# Patient Record
Sex: FEMALE | Race: WHITE | NOT HISPANIC OR LATINO | Employment: UNEMPLOYED | URBAN - METROPOLITAN AREA
[De-identification: names, ages, dates, MRNs, and addresses within clinical notes are randomized per-mention and may not be internally consistent; named-entity substitution may affect disease eponyms.]

---

## 2017-12-09 ENCOUNTER — OFFICE VISIT (OUTPATIENT)
Dept: URGENT CARE | Facility: PHYSICIAN GROUP | Age: 42
End: 2017-12-09
Payer: COMMERCIAL

## 2017-12-09 VITALS
HEART RATE: 90 BPM | TEMPERATURE: 98.1 F | OXYGEN SATURATION: 95 % | RESPIRATION RATE: 16 BRPM | BODY MASS INDEX: 32.47 KG/M2 | HEIGHT: 61 IN | DIASTOLIC BLOOD PRESSURE: 64 MMHG | SYSTOLIC BLOOD PRESSURE: 106 MMHG | WEIGHT: 172 LBS

## 2017-12-09 DIAGNOSIS — H66.002 ACUTE SUPPURATIVE OTITIS MEDIA OF LEFT EAR WITHOUT SPONTANEOUS RUPTURE OF TYMPANIC MEMBRANE, RECURRENCE NOT SPECIFIED: Primary | ICD-10-CM

## 2017-12-09 DIAGNOSIS — R09.81 SINUS CONGESTION: ICD-10-CM

## 2017-12-09 PROCEDURE — 99214 OFFICE O/P EST MOD 30 MIN: CPT | Performed by: PHYSICIAN ASSISTANT

## 2017-12-09 RX ORDER — AMOXICILLIN 500 MG/1
500 CAPSULE ORAL 3 TIMES DAILY
Qty: 30 CAP | Refills: 0 | Status: SHIPPED | OUTPATIENT
Start: 2017-12-09 | End: 2020-11-19

## 2017-12-09 NOTE — PROGRESS NOTES
Subjective:      Maria C Lay is a 42 y.o. female who presents with Sinus Problem (C/o ears feel plugged/wet, runny eyes, nasal congestion, pain x3 days)    PMH:  has a past medical history of Arthritis; Pain; and Tibia and fibula open fracture, right.  MEDS:   Current Outpatient Prescriptions:   •  IBUPROFEN PO, Take  by mouth., Disp: , Rfl:   •  amoxicillin (AMOXIL) 500 MG Cap, Take 1 Cap by mouth 3 times a day., Disp: 30 Cap, Rfl: 0  •  gabapentin (NEURONTIN) 300 MG Cap, Take 300 mg by mouth every bedtime., Disp: , Rfl:   •  hydrocodone-acetaminophen (NORCO) 5-325 MG Tab per tablet, Take 1-2 Tabs by mouth every four hours as needed., Disp: 30 Tab, Rfl: 0  ALLERGIES: No Known Allergies  SURGHX:   Past Surgical History:   Procedure Laterality Date   • STEPHANIE BY LAPAROSCOPY  11/9/2016    Procedure: STEPHANIE BY LAPAROSCOPY;  Surgeon: Campos Frankel M.D.;  Location: SURGERY [facility];  Service:    • ANKLE FUSION Right 5/16/2016    Procedure: ANKLE FUSION deep cultures, bone graft gastroc soleus recession ;  Surgeon: Steffen Apple M.D.;  Location: [facility];  Service:    • HARDWARE REMOVAL ORTHO  5/16/2016    Procedure: HARDWARE REMOVAL ORTHO, repairs as indicated  ;  Surgeon: Steffen Apple M.D.;  Location: [facility];  Service:    • APPENDECTOMY LAPAROSCOPIC  7/14/2012    Performed by CAMPOS FRANKEL at [facility]   • OTHER ORTHOPEDIC SURGERY Right 2007     rt ankle repair,k pins   • OTHER ORTHOPEDIC SURGERY Right 2006 x 2 surgeries     rt ankle tib/fib,external fixator     SOCHX:  reports that she quit smoking about 6 years ago. Her smoking use included Cigarettes. She has a 25.00 pack-year smoking history. She has never used smokeless tobacco. She reports that she does not drink alcohol or use drugs.  FH: family history includes Cancer in her maternal grandmother. Reviewed with patient/family. Not pertinent to this complaint.          Otalgia    There is pain in  "both ears. This is a new problem. The current episode started in the past 7 days. The problem occurs every few minutes. The problem has been rapidly worsening. There has been no fever. The pain is moderate. Associated symptoms include headaches and rhinorrhea. Pertinent negatives include no ear discharge or sore throat. She has tried acetaminophen for the symptoms. The treatment provided no relief. There is no history of hearing loss.       Review of Systems   Constitutional: Negative for fever.   HENT: Positive for congestion, ear pain, rhinorrhea and sinus pain. Negative for ear discharge and sore throat.    Neurological: Positive for headaches.   All other systems reviewed and are negative.         Objective:     /64   Pulse 90   Temp 36.7 °C (98.1 °F)   Resp 16   Ht 1.549 m (5' 1\")   Wt 78 kg (172 lb)   SpO2 95%   BMI 32.50 kg/m²      Physical Exam   Constitutional: She appears well-developed and well-nourished. No distress.   HENT:   Head: Normocephalic.   Right Ear: Tympanic membrane normal.   Left Ear: There is tenderness. Tympanic membrane is erythematous and bulging. A middle ear effusion is present.   Nose: Mucosal edema and rhinorrhea present.   Mouth/Throat: Uvula is midline and oropharynx is clear and moist.   Eyes: Conjunctivae and EOM are normal. Pupils are equal, round, and reactive to light.   Neck: Normal range of motion. Neck supple.   Cardiovascular: Normal rate, regular rhythm and normal heart sounds.    Pulmonary/Chest: Effort normal. No respiratory distress. She has rhonchi. She has no rales.   Abdominal: Soft.   Musculoskeletal: Normal range of motion.   Lymphadenopathy:     She has no cervical adenopathy.   Neurological: She is alert.   Skin: Skin is warm and dry. Capillary refill takes less than 2 seconds.   Psychiatric: She has a normal mood and affect.   Nursing note and vitals reviewed.         Assessment/Plan:     1. Acute suppurative otitis media of left ear without " spontaneous rupture of tympanic membrane, recurrence not specified  amoxicillin (AMOXIL) 500 MG Cap   2. Sinus congestion  amoxicillin (AMOXIL) 500 MG Cap     Motrin/Advil/Ibuprophen 600 mg every 6 hours as needed for pain or fever.    PT should follow up with PCP in 1-2 days for re-evaluation if symptoms have not improved.  Discussed red flags and reasons to return to UC or ED.  Pt and/or family verbalized understanding of diagnosis and follow up instructions and declined informational handout on diagnosis.  PT discharged.

## 2017-12-13 ASSESSMENT — ENCOUNTER SYMPTOMS
HEADACHES: 1
SORE THROAT: 0
SINUS PAIN: 1
RHINORRHEA: 1
FEVER: 0

## 2018-05-12 ENCOUNTER — OFFICE VISIT (OUTPATIENT)
Dept: URGENT CARE | Facility: PHYSICIAN GROUP | Age: 43
End: 2018-05-12
Payer: COMMERCIAL

## 2018-05-12 VITALS
SYSTOLIC BLOOD PRESSURE: 104 MMHG | BODY MASS INDEX: 27.38 KG/M2 | OXYGEN SATURATION: 100 % | HEART RATE: 80 BPM | TEMPERATURE: 98.5 F | DIASTOLIC BLOOD PRESSURE: 62 MMHG | WEIGHT: 145 LBS | HEIGHT: 61 IN

## 2018-05-12 DIAGNOSIS — J01.40 ACUTE PANSINUSITIS, RECURRENCE NOT SPECIFIED: ICD-10-CM

## 2018-05-12 PROCEDURE — 99214 OFFICE O/P EST MOD 30 MIN: CPT | Performed by: NURSE PRACTITIONER

## 2018-05-12 RX ORDER — FLUTICASONE PROPIONATE 50 MCG
2 SPRAY, SUSPENSION (ML) NASAL DAILY
Qty: 1 BOTTLE | Refills: 0 | Status: SHIPPED | OUTPATIENT
Start: 2018-05-12 | End: 2020-11-19

## 2018-05-12 RX ORDER — METHYLPREDNISOLONE 4 MG/1
TABLET ORAL
Qty: 1 KIT | Refills: 0 | Status: SHIPPED | OUTPATIENT
Start: 2018-05-12 | End: 2020-11-19

## 2018-05-12 RX ORDER — DOXYCYCLINE HYCLATE 100 MG
100 TABLET ORAL 2 TIMES DAILY
Qty: 14 TAB | Refills: 0 | Status: SHIPPED | OUTPATIENT
Start: 2018-05-12 | End: 2020-11-19

## 2018-05-12 ASSESSMENT — ENCOUNTER SYMPTOMS
SORE THROAT: 0
SENSORY CHANGE: 0
EYE REDNESS: 0
HEADACHES: 1
SINUS PAIN: 1
WHEEZING: 0
FOCAL WEAKNESS: 0
COUGH: 0
MYALGIAS: 0
DIARRHEA: 0
VOMITING: 0
NAUSEA: 0
NECK PAIN: 0
CONSTIPATION: 0
ABDOMINAL PAIN: 0
CHILLS: 0
DIZZINESS: 0
SHORTNESS OF BREATH: 0
SPEECH CHANGE: 0
WEAKNESS: 0
TINGLING: 0
FEVER: 0
EYE DISCHARGE: 0

## 2018-05-12 NOTE — PROGRESS NOTES
Subjective:      Maria C Lay is a 42 y.o. female who presents with Sinusitis (Rt sided facial pressure, nasal congestion x 1 week )            HPI  Maria C is here for right sided nasal congestion, right sided facial/ear pressure, fatigue x 1 week. Sudafed x 1 which did not help. No nasal spray or flushing.    PMH:  has a past medical history of Arthritis; Pain; and Tibia and fibula open fracture, right.  MEDS:   Current Outpatient Prescriptions:   •  fluticasone (FLONASE) 50 MCG/ACT nasal spray, Spray 2 Sprays in nose every day., Disp: 1 Bottle, Rfl: 0  •  MethylPREDNISolone (MEDROL DOSEPAK) 4 MG Tablet Therapy Pack, Use as directed, Disp: 1 Kit, Rfl: 0  •  doxycycline (VIBRAMYCIN) 100 MG Tab, Take 1 Tab by mouth 2 times a day., Disp: 14 Tab, Rfl: 0  •  IBUPROFEN PO, Take  by mouth., Disp: , Rfl:   •  amoxicillin (AMOXIL) 500 MG Cap, Take 1 Cap by mouth 3 times a day., Disp: 30 Cap, Rfl: 0  •  gabapentin (NEURONTIN) 300 MG Cap, Take 300 mg by mouth every bedtime., Disp: , Rfl:   •  hydrocodone-acetaminophen (NORCO) 5-325 MG Tab per tablet, Take 1-2 Tabs by mouth every four hours as needed., Disp: 30 Tab, Rfl: 0  ALLERGIES: No Known Allergies  SURGHX:   Past Surgical History:   Procedure Laterality Date   • STEPHANIE BY LAPAROSCOPY  11/9/2016    Procedure: STEPHANIE BY LAPAROSCOPY;  Surgeon: Campos Frankel M.D.;  Location: [facility];  Service:    • ANKLE FUSION Right 5/16/2016    Procedure: ANKLE FUSION deep cultures, bone graft gastroc soleus recession ;  Surgeon: Steffen Apple M.D.;  Location: [facility];  Service:    • HARDWARE REMOVAL ORTHO  5/16/2016    Procedure: HARDWARE REMOVAL ORTHO, repairs as indicated  ;  Surgeon: Steffen Apple M.D.;  Location: [facility];  Service:    • APPENDECTOMY LAPAROSCOPIC  7/14/2012    Performed by CAMPOS FRANKEL at [facility]   • OTHER ORTHOPEDIC SURGERY Right 2007     rt ankle repair,k pins   • OTHER ORTHOPEDIC SURGERY Right  "2006 x 2 surgeries     rt ankle tib/fib,external fixator     SOCHX:  reports that she quit smoking about 7 years ago. Her smoking use included Cigarettes. She has a 25.00 pack-year smoking history. She has never used smokeless tobacco. She reports that she does not drink alcohol or use drugs.  FH: Family history was reviewed, no pertinent findings to report  '  Review of Systems   Constitutional: Positive for malaise/fatigue. Negative for chills and fever.   HENT: Positive for congestion, ear pain and sinus pain. Negative for sore throat.    Eyes: Negative for discharge and redness.   Respiratory: Negative for cough, shortness of breath and wheezing.    Gastrointestinal: Negative for abdominal pain, constipation, diarrhea, nausea and vomiting.   Musculoskeletal: Negative for myalgias and neck pain.   Neurological: Positive for headaches. Negative for dizziness, tingling, sensory change, speech change, focal weakness and weakness.   Endo/Heme/Allergies: Negative for environmental allergies.   All other systems reviewed and are negative.         Objective:     /62   Pulse 80   Temp 36.9 °C (98.5 °F)   Ht 1.549 m (5' 1\")   Wt 65.8 kg (145 lb)   SpO2 100%   Breastfeeding? No   BMI 27.40 kg/m²      Physical Exam   Constitutional: She is oriented to person, place, and time. Vital signs are normal. She appears well-developed and well-nourished. She is active and cooperative.  Non-toxic appearance. She does not have a sickly appearance. She appears ill. No distress.   HENT:   Head: Normocephalic.   Right Ear: Hearing and external ear normal. Tympanic membrane is injected and bulging.   Left Ear: Hearing, external ear and ear canal normal. Tympanic membrane is injected.   Nose: Mucosal edema, rhinorrhea and sinus tenderness present. Right sinus exhibits maxillary sinus tenderness and frontal sinus tenderness.   Mouth/Throat: Uvula is midline. Mucous membranes are dry. No uvula swelling. No posterior " oropharyngeal edema or posterior oropharyngeal erythema.   Eyes: Conjunctivae and EOM are normal. Pupils are equal, round, and reactive to light.   Neck: Normal range of motion. Neck supple.   Cardiovascular: Normal rate, regular rhythm and normal heart sounds.    Pulmonary/Chest: Effort normal and breath sounds normal. No accessory muscle usage. No respiratory distress. She has no decreased breath sounds. She has no wheezes. She has no rhonchi. She has no rales.   Musculoskeletal: Normal range of motion.   Neurological: She is alert and oriented to person, place, and time.   Skin: Skin is warm. She is not diaphoretic.   Vitals reviewed.              Assessment/Plan:     1. Acute pansinusitis, recurrence not specified    - fluticasone (FLONASE) 50 MCG/ACT nasal spray; Spray 2 Sprays in nose every day.  Dispense: 1 Bottle; Refill: 0  - MethylPREDNISolone (MEDROL DOSEPAK) 4 MG Tablet Therapy Pack; Use as directed  Dispense: 1 Kit; Refill: 0  - doxycycline (VIBRAMYCIN) 100 MG Tab; Take 1 Tab by mouth 2 times a day.  Dispense: 14 Tab; Refill: 0    Increase water intake  Get rest  May use Ibuprofen/Tylenol prn for any fever, body aches or throat pain  May take longer acting antihistamine for seasonal allergy symptoms prn  May use saline nasal spray for nasal congestion  May use Flonase for allergen nasal congestion  May gargle with salt water prn for throat discomfort  May drink smoothies for nutrition if too painful to swallow solid foods  Monitor for productive cough, SOB and chest pain/tightness- need re-evaluation

## 2020-11-18 ENCOUNTER — TELEPHONE (OUTPATIENT)
Dept: SCHEDULING | Facility: IMAGING CENTER | Age: 45
End: 2020-11-18

## 2020-11-19 ENCOUNTER — TELEMEDICINE (OUTPATIENT)
Dept: MEDICAL GROUP | Facility: PHYSICIAN GROUP | Age: 45
End: 2020-11-19
Payer: COMMERCIAL

## 2020-11-19 VITALS — WEIGHT: 135 LBS | HEIGHT: 61 IN | BODY MASS INDEX: 25.49 KG/M2

## 2020-11-19 DIAGNOSIS — Z98.1 H/O ANKLE FUSION: ICD-10-CM

## 2020-11-19 DIAGNOSIS — M25.571 CHRONIC PAIN OF RIGHT ANKLE: ICD-10-CM

## 2020-11-19 DIAGNOSIS — G89.29 CHRONIC PAIN OF RIGHT ANKLE: ICD-10-CM

## 2020-11-19 PROCEDURE — 99203 OFFICE O/P NEW LOW 30 MIN: CPT | Mod: 95,CR | Performed by: FAMILY MEDICINE
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
## 2020-12-18 ENCOUNTER — HOSPITAL ENCOUNTER (OUTPATIENT)
Dept: RADIOLOGY | Facility: MEDICAL CENTER | Age: 45
End: 2020-12-18
Attending: FAMILY MEDICINE
Payer: COMMERCIAL

## 2020-12-18 DIAGNOSIS — Z12.31 VISIT FOR SCREENING MAMMOGRAM: ICD-10-CM

## 2020-12-18 PROCEDURE — 77067 SCR MAMMO BI INCL CAD: CPT

## 2021-01-22 ENCOUNTER — PRE-ADMISSION TESTING (OUTPATIENT)
Dept: ADMISSIONS | Facility: MEDICAL CENTER | Age: 46
DRG: 476 | End: 2021-01-22
Attending: ORTHOPAEDIC SURGERY
Payer: COMMERCIAL

## 2021-01-22 DIAGNOSIS — Z01.812 PRE-OPERATIVE LABORATORY EXAMINATION: ICD-10-CM

## 2021-01-22 LAB
ANION GAP SERPL CALC-SCNC: 9 MMOL/L (ref 7–16)
BUN SERPL-MCNC: 10 MG/DL (ref 8–22)
CALCIUM SERPL-MCNC: 9 MG/DL (ref 8.5–10.5)
CHLORIDE SERPL-SCNC: 104 MMOL/L (ref 96–112)
CO2 SERPL-SCNC: 25 MMOL/L (ref 20–33)
CREAT SERPL-MCNC: 0.84 MG/DL (ref 0.5–1.4)
ERYTHROCYTE [DISTWIDTH] IN BLOOD BY AUTOMATED COUNT: 48.7 FL (ref 35.9–50)
GLUCOSE SERPL-MCNC: 84 MG/DL (ref 65–99)
HCT VFR BLD AUTO: 45.1 % (ref 37–47)
HGB BLD-MCNC: 14.3 G/DL (ref 12–16)
MCH RBC QN AUTO: 31.2 PG (ref 27–33)
MCHC RBC AUTO-ENTMCNC: 31.7 G/DL (ref 33.6–35)
MCV RBC AUTO: 98.3 FL (ref 81.4–97.8)
PLATELET # BLD AUTO: 388 K/UL (ref 164–446)
PMV BLD AUTO: 11.8 FL (ref 9–12.9)
POTASSIUM SERPL-SCNC: 4.4 MMOL/L (ref 3.6–5.5)
RBC # BLD AUTO: 4.59 M/UL (ref 4.2–5.4)
SODIUM SERPL-SCNC: 138 MMOL/L (ref 135–145)
WBC # BLD AUTO: 10.6 K/UL (ref 4.8–10.8)

## 2021-01-22 PROCEDURE — 36415 COLL VENOUS BLD VENIPUNCTURE: CPT

## 2021-01-22 PROCEDURE — 80048 BASIC METABOLIC PNL TOTAL CA: CPT

## 2021-01-22 PROCEDURE — 85027 COMPLETE CBC AUTOMATED: CPT

## 2021-01-22 RX ORDER — CALCIUM CARBONATE 300MG(750)
800 TABLET,CHEWABLE ORAL EVERY EVENING
COMMUNITY
End: 2021-06-08

## 2021-01-22 RX ORDER — IBUPROFEN 200 MG
400 TABLET ORAL EVERY 6 HOURS PRN
Status: ON HOLD | COMMUNITY
End: 2021-02-01

## 2021-01-29 ENCOUNTER — APPOINTMENT (OUTPATIENT)
Dept: ADMISSIONS | Facility: MEDICAL CENTER | Age: 46
DRG: 476 | End: 2021-01-29
Attending: ORTHOPAEDIC SURGERY
Payer: COMMERCIAL

## 2021-01-29 DIAGNOSIS — Z01.812 PRE-OPERATIVE LABORATORY EXAMINATION: ICD-10-CM

## 2021-01-29 LAB
SARS-COV-2 RNA RESP QL NAA+PROBE: NOTDETECTED
SPECIMEN SOURCE: NORMAL

## 2021-01-29 PROCEDURE — C9803 HOPD COVID-19 SPEC COLLECT: HCPCS

## 2021-01-29 PROCEDURE — U0003 INFECTIOUS AGENT DETECTION BY NUCLEIC ACID (DNA OR RNA); SEVERE ACUTE RESPIRATORY SYNDROME CORONAVIRUS 2 (SARS-COV-2) (CORONAVIRUS DISEASE [COVID-19]), AMPLIFIED PROBE TECHNIQUE, MAKING USE OF HIGH THROUGHPUT TECHNOLOGIES AS DESCRIBED BY CMS-2020-01-R: HCPCS

## 2021-01-29 PROCEDURE — U0005 INFEC AGEN DETEC AMPLI PROBE: HCPCS

## 2021-02-01 ENCOUNTER — ANESTHESIA EVENT (OUTPATIENT)
Dept: SURGERY | Facility: MEDICAL CENTER | Age: 46
DRG: 476 | End: 2021-02-01
Payer: COMMERCIAL

## 2021-02-01 ENCOUNTER — HOSPITAL ENCOUNTER (INPATIENT)
Facility: MEDICAL CENTER | Age: 46
LOS: 1 days | DRG: 476 | End: 2021-02-02
Attending: ORTHOPAEDIC SURGERY | Admitting: ORTHOPAEDIC SURGERY
Payer: COMMERCIAL

## 2021-02-01 ENCOUNTER — ANESTHESIA (OUTPATIENT)
Dept: SURGERY | Facility: MEDICAL CENTER | Age: 46
DRG: 476 | End: 2021-02-01
Payer: COMMERCIAL

## 2021-02-01 DIAGNOSIS — S88.119A BELOW KNEE AMPUTATION (HCC): ICD-10-CM

## 2021-02-01 LAB
BASOPHILS # BLD AUTO: 0.1 % (ref 0–1.8)
BASOPHILS # BLD: 0.02 K/UL (ref 0–0.12)
EOSINOPHIL # BLD AUTO: 0.01 K/UL (ref 0–0.51)
EOSINOPHIL NFR BLD: 0.1 % (ref 0–6.9)
ERYTHROCYTE [DISTWIDTH] IN BLOOD BY AUTOMATED COUNT: 46.2 FL (ref 35.9–50)
HCG UR QL: NEGATIVE
HCT VFR BLD AUTO: 39.8 % (ref 37–47)
HGB BLD-MCNC: 12.5 G/DL (ref 12–16)
IMM GRANULOCYTES # BLD AUTO: 0.1 K/UL (ref 0–0.11)
IMM GRANULOCYTES NFR BLD AUTO: 0.7 % (ref 0–0.9)
LYMPHOCYTES # BLD AUTO: 0.79 K/UL (ref 1–4.8)
LYMPHOCYTES NFR BLD: 5.5 % (ref 22–41)
MCH RBC QN AUTO: 30.3 PG (ref 27–33)
MCHC RBC AUTO-ENTMCNC: 31.4 G/DL (ref 33.6–35)
MCV RBC AUTO: 96.6 FL (ref 81.4–97.8)
MONOCYTES # BLD AUTO: 0.29 K/UL (ref 0–0.85)
MONOCYTES NFR BLD AUTO: 2 % (ref 0–13.4)
NEUTROPHILS # BLD AUTO: 13.23 K/UL (ref 2–7.15)
NEUTROPHILS NFR BLD: 91.6 % (ref 44–72)
NRBC # BLD AUTO: 0 K/UL
NRBC BLD-RTO: 0 /100 WBC
PATHOLOGY CONSULT NOTE: NORMAL
PLATELET # BLD AUTO: 329 K/UL (ref 164–446)
PMV BLD AUTO: 11.8 FL (ref 9–12.9)
RBC # BLD AUTO: 4.12 M/UL (ref 4.2–5.4)
WBC # BLD AUTO: 14.4 K/UL (ref 4.8–10.8)

## 2021-02-01 PROCEDURE — 770006 HCHG ROOM/CARE - MED/SURG/GYN SEMI*

## 2021-02-01 PROCEDURE — 88311 DECALCIFY TISSUE: CPT

## 2021-02-01 PROCEDURE — A9270 NON-COVERED ITEM OR SERVICE: HCPCS | Performed by: ORTHOPAEDIC SURGERY

## 2021-02-01 PROCEDURE — 160048 HCHG OR STATISTICAL LEVEL 1-5: Performed by: ORTHOPAEDIC SURGERY

## 2021-02-01 PROCEDURE — 3E0T3BZ INTRODUCTION OF ANESTHETIC AGENT INTO PERIPHERAL NERVES AND PLEXI, PERCUTANEOUS APPROACH: ICD-10-PCS | Performed by: ANESTHESIOLOGY

## 2021-02-01 PROCEDURE — 160039 HCHG SURGERY MINUTES - EA ADDL 1 MIN LEVEL 3: Performed by: ORTHOPAEDIC SURGERY

## 2021-02-01 PROCEDURE — L1830 KO IMMOB CANVAS LONG PRE OTS: HCPCS | Performed by: ORTHOPAEDIC SURGERY

## 2021-02-01 PROCEDURE — 81025 URINE PREGNANCY TEST: CPT

## 2021-02-01 PROCEDURE — 160036 HCHG PACU - EA ADDL 30 MINS PHASE I: Performed by: ORTHOPAEDIC SURGERY

## 2021-02-01 PROCEDURE — 700111 HCHG RX REV CODE 636 W/ 250 OVERRIDE (IP): Performed by: ANESTHESIOLOGY

## 2021-02-01 PROCEDURE — 160009 HCHG ANES TIME/MIN: Performed by: ORTHOPAEDIC SURGERY

## 2021-02-01 PROCEDURE — 160002 HCHG RECOVERY MINUTES (STAT): Performed by: ORTHOPAEDIC SURGERY

## 2021-02-01 PROCEDURE — 0Y6H0Z1 DETACHMENT AT RIGHT LOWER LEG, HIGH, OPEN APPROACH: ICD-10-PCS | Performed by: ORTHOPAEDIC SURGERY

## 2021-02-01 PROCEDURE — 36415 COLL VENOUS BLD VENIPUNCTURE: CPT

## 2021-02-01 PROCEDURE — 700102 HCHG RX REV CODE 250 W/ 637 OVERRIDE(OP): Performed by: ORTHOPAEDIC SURGERY

## 2021-02-01 PROCEDURE — 700102 HCHG RX REV CODE 250 W/ 637 OVERRIDE(OP): Performed by: ANESTHESIOLOGY

## 2021-02-01 PROCEDURE — 700105 HCHG RX REV CODE 258: Performed by: ORTHOPAEDIC SURGERY

## 2021-02-01 PROCEDURE — 501838 HCHG SUTURE GENERAL: Performed by: ORTHOPAEDIC SURGERY

## 2021-02-01 PROCEDURE — 160028 HCHG SURGERY MINUTES - 1ST 30 MINS LEVEL 3: Performed by: ORTHOPAEDIC SURGERY

## 2021-02-01 PROCEDURE — 700101 HCHG RX REV CODE 250: Performed by: ANESTHESIOLOGY

## 2021-02-01 PROCEDURE — 500367 HCHG DRAIN KIT, HEMOVAC: Performed by: ORTHOPAEDIC SURGERY

## 2021-02-01 PROCEDURE — 88307 TISSUE EXAM BY PATHOLOGIST: CPT

## 2021-02-01 PROCEDURE — A9270 NON-COVERED ITEM OR SERVICE: HCPCS | Performed by: ANESTHESIOLOGY

## 2021-02-01 PROCEDURE — 700101 HCHG RX REV CODE 250: Performed by: ORTHOPAEDIC SURGERY

## 2021-02-01 PROCEDURE — 85025 COMPLETE CBC W/AUTO DIFF WBC: CPT

## 2021-02-01 PROCEDURE — 64447 NJX AA&/STRD FEMORAL NRV IMG: CPT | Performed by: ORTHOPAEDIC SURGERY

## 2021-02-01 PROCEDURE — 500881 HCHG PACK, EXTREMITY: Performed by: ORTHOPAEDIC SURGERY

## 2021-02-01 PROCEDURE — 700105 HCHG RX REV CODE 258: Performed by: PHYSICIAN ASSISTANT

## 2021-02-01 PROCEDURE — 160035 HCHG PACU - 1ST 60 MINS PHASE I: Performed by: ORTHOPAEDIC SURGERY

## 2021-02-01 PROCEDURE — 503339 HCHG DRESSSING PICO: Performed by: ORTHOPAEDIC SURGERY

## 2021-02-01 PROCEDURE — 700111 HCHG RX REV CODE 636 W/ 250 OVERRIDE (IP): Performed by: ORTHOPAEDIC SURGERY

## 2021-02-01 RX ORDER — IBUPROFEN 200 MG
800 TABLET ORAL 3 TIMES DAILY
Status: DISCONTINUED | OUTPATIENT
Start: 2021-02-01 | End: 2021-02-02 | Stop reason: HOSPADM

## 2021-02-01 RX ORDER — HALOPERIDOL 5 MG/ML
1 INJECTION INTRAMUSCULAR
Status: DISCONTINUED | OUTPATIENT
Start: 2021-02-01 | End: 2021-02-01 | Stop reason: HOSPADM

## 2021-02-01 RX ORDER — HYDROMORPHONE HYDROCHLORIDE 1 MG/ML
0.5 INJECTION, SOLUTION INTRAMUSCULAR; INTRAVENOUS; SUBCUTANEOUS
Status: DISCONTINUED | OUTPATIENT
Start: 2021-02-01 | End: 2021-02-01 | Stop reason: HOSPADM

## 2021-02-01 RX ORDER — MIDAZOLAM HYDROCHLORIDE 1 MG/ML
INJECTION INTRAMUSCULAR; INTRAVENOUS PRN
Status: DISCONTINUED | OUTPATIENT
Start: 2021-02-01 | End: 2021-02-01 | Stop reason: SURG

## 2021-02-01 RX ORDER — LIDOCAINE HYDROCHLORIDE 20 MG/ML
INJECTION, SOLUTION EPIDURAL; INFILTRATION; INTRACAUDAL; PERINEURAL PRN
Status: DISCONTINUED | OUTPATIENT
Start: 2021-02-01 | End: 2021-02-01 | Stop reason: SURG

## 2021-02-01 RX ORDER — MAGNESIUM HYDROXIDE 1200 MG/15ML
LIQUID ORAL
Status: COMPLETED | OUTPATIENT
Start: 2021-02-01 | End: 2021-02-01

## 2021-02-01 RX ORDER — OXYCODONE HCL 5 MG/5 ML
10 SOLUTION, ORAL ORAL
Status: COMPLETED | OUTPATIENT
Start: 2021-02-01 | End: 2021-02-01

## 2021-02-01 RX ORDER — SODIUM CHLORIDE, SODIUM LACTATE, POTASSIUM CHLORIDE, CALCIUM CHLORIDE 600; 310; 30; 20 MG/100ML; MG/100ML; MG/100ML; MG/100ML
INJECTION, SOLUTION INTRAVENOUS CONTINUOUS
Status: DISCONTINUED | OUTPATIENT
Start: 2021-02-01 | End: 2021-02-01 | Stop reason: HOSPADM

## 2021-02-01 RX ORDER — OXYCODONE HYDROCHLORIDE 10 MG/1
10 TABLET ORAL EVERY 4 HOURS PRN
Status: DISCONTINUED | OUTPATIENT
Start: 2021-02-01 | End: 2021-02-02 | Stop reason: HOSPADM

## 2021-02-01 RX ORDER — MEPERIDINE HYDROCHLORIDE 25 MG/ML
12.5 INJECTION INTRAMUSCULAR; INTRAVENOUS; SUBCUTANEOUS
Status: DISCONTINUED | OUTPATIENT
Start: 2021-02-01 | End: 2021-02-01 | Stop reason: HOSPADM

## 2021-02-01 RX ORDER — SODIUM CHLORIDE, SODIUM LACTATE, POTASSIUM CHLORIDE, CALCIUM CHLORIDE 600; 310; 30; 20 MG/100ML; MG/100ML; MG/100ML; MG/100ML
INJECTION, SOLUTION INTRAVENOUS CONTINUOUS
Status: ACTIVE | OUTPATIENT
Start: 2021-02-01 | End: 2021-02-01

## 2021-02-01 RX ORDER — VANCOMYCIN HYDROCHLORIDE 1 G/20ML
INJECTION, POWDER, LYOPHILIZED, FOR SOLUTION INTRAVENOUS
Status: COMPLETED | OUTPATIENT
Start: 2021-02-01 | End: 2021-02-01

## 2021-02-01 RX ORDER — OXYCODONE HYDROCHLORIDE 5 MG/1
2.5 TABLET ORAL
Status: DISCONTINUED | OUTPATIENT
Start: 2021-02-01 | End: 2021-02-01

## 2021-02-01 RX ORDER — DEXAMETHASONE SODIUM PHOSPHATE 4 MG/ML
INJECTION, SOLUTION INTRA-ARTICULAR; INTRALESIONAL; INTRAMUSCULAR; INTRAVENOUS; SOFT TISSUE PRN
Status: DISCONTINUED | OUTPATIENT
Start: 2021-02-01 | End: 2021-02-01 | Stop reason: SURG

## 2021-02-01 RX ORDER — HYDROMORPHONE HYDROCHLORIDE 1 MG/ML
0.4 INJECTION, SOLUTION INTRAMUSCULAR; INTRAVENOUS; SUBCUTANEOUS
Status: DISCONTINUED | OUTPATIENT
Start: 2021-02-01 | End: 2021-02-01 | Stop reason: HOSPADM

## 2021-02-01 RX ORDER — SODIUM CHLORIDE 9 MG/ML
1000 INJECTION, SOLUTION INTRAVENOUS ONCE
Status: COMPLETED | OUTPATIENT
Start: 2021-02-01 | End: 2021-02-01

## 2021-02-01 RX ORDER — OXYCODONE HYDROCHLORIDE 5 MG/1
5 TABLET ORAL EVERY 4 HOURS PRN
Status: DISCONTINUED | OUTPATIENT
Start: 2021-02-01 | End: 2021-02-02 | Stop reason: HOSPADM

## 2021-02-01 RX ORDER — ONDANSETRON 2 MG/ML
4 INJECTION INTRAMUSCULAR; INTRAVENOUS EVERY 4 HOURS PRN
Status: DISCONTINUED | OUTPATIENT
Start: 2021-02-01 | End: 2021-02-02 | Stop reason: HOSPADM

## 2021-02-01 RX ORDER — HYDRALAZINE HYDROCHLORIDE 20 MG/ML
5 INJECTION INTRAMUSCULAR; INTRAVENOUS
Status: DISCONTINUED | OUTPATIENT
Start: 2021-02-01 | End: 2021-02-01 | Stop reason: HOSPADM

## 2021-02-01 RX ORDER — ONDANSETRON 2 MG/ML
INJECTION INTRAMUSCULAR; INTRAVENOUS PRN
Status: DISCONTINUED | OUTPATIENT
Start: 2021-02-01 | End: 2021-02-01 | Stop reason: SURG

## 2021-02-01 RX ORDER — HYDROMORPHONE HYDROCHLORIDE 1 MG/ML
0.2 INJECTION, SOLUTION INTRAMUSCULAR; INTRAVENOUS; SUBCUTANEOUS
Status: DISCONTINUED | OUTPATIENT
Start: 2021-02-01 | End: 2021-02-01 | Stop reason: HOSPADM

## 2021-02-01 RX ORDER — BUPIVACAINE HYDROCHLORIDE 5 MG/ML
INJECTION, SOLUTION EPIDURAL; INTRACAUDAL PRN
Status: DISCONTINUED | OUTPATIENT
Start: 2021-02-01 | End: 2021-02-01 | Stop reason: SURG

## 2021-02-01 RX ORDER — DIPHENHYDRAMINE HYDROCHLORIDE 50 MG/ML
12.5 INJECTION INTRAMUSCULAR; INTRAVENOUS
Status: DISCONTINUED | OUTPATIENT
Start: 2021-02-01 | End: 2021-02-01 | Stop reason: HOSPADM

## 2021-02-01 RX ORDER — OXYCODONE HCL 5 MG/5 ML
5 SOLUTION, ORAL ORAL
Status: COMPLETED | OUTPATIENT
Start: 2021-02-01 | End: 2021-02-01

## 2021-02-01 RX ORDER — ONDANSETRON 2 MG/ML
4 INJECTION INTRAMUSCULAR; INTRAVENOUS
Status: DISCONTINUED | OUTPATIENT
Start: 2021-02-01 | End: 2021-02-01 | Stop reason: HOSPADM

## 2021-02-01 RX ORDER — CEFAZOLIN SODIUM 1 G/3ML
INJECTION, POWDER, FOR SOLUTION INTRAMUSCULAR; INTRAVENOUS PRN
Status: DISCONTINUED | OUTPATIENT
Start: 2021-02-01 | End: 2021-02-01 | Stop reason: SURG

## 2021-02-01 RX ORDER — ACETAMINOPHEN 500 MG
1000 TABLET ORAL EVERY 6 HOURS
Status: DISCONTINUED | OUTPATIENT
Start: 2021-02-01 | End: 2021-02-02 | Stop reason: HOSPADM

## 2021-02-01 RX ADMIN — OXYCODONE HYDROCHLORIDE 10 MG: 10 TABLET ORAL at 16:59

## 2021-02-01 RX ADMIN — OXYCODONE 2.5 MG: 5 TABLET ORAL at 15:23

## 2021-02-01 RX ADMIN — OXYCODONE HYDROCHLORIDE 10 MG: 5 SOLUTION ORAL at 09:54

## 2021-02-01 RX ADMIN — LIDOCAINE HYDROCHLORIDE 0.5 ML: 10 INJECTION, SOLUTION EPIDURAL; INFILTRATION; INTRACAUDAL; PERINEURAL at 07:50

## 2021-02-01 RX ADMIN — DEXAMETHASONE SODIUM PHOSPHATE 8 MG: 4 INJECTION, SOLUTION INTRA-ARTICULAR; INTRALESIONAL; INTRAMUSCULAR; INTRAVENOUS; SOFT TISSUE at 08:40

## 2021-02-01 RX ADMIN — SODIUM CHLORIDE 1000 ML: 9 INJECTION, SOLUTION INTRAVENOUS at 13:16

## 2021-02-01 RX ADMIN — LIDOCAINE HYDROCHLORIDE 60 MG: 20 INJECTION, SOLUTION EPIDURAL; INFILTRATION; INTRACAUDAL at 08:34

## 2021-02-01 RX ADMIN — FENTANYL CITRATE 50 MCG: 50 INJECTION, SOLUTION INTRAMUSCULAR; INTRAVENOUS at 09:52

## 2021-02-01 RX ADMIN — IBUPROFEN 800 MG: 200 TABLET, FILM COATED ORAL at 11:55

## 2021-02-01 RX ADMIN — BUPIVACAINE HYDROCHLORIDE 18 ML: 5 INJECTION, SOLUTION EPIDURAL; INTRACAUDAL; PERINEURAL at 08:28

## 2021-02-01 RX ADMIN — MIDAZOLAM HYDROCHLORIDE 2 MG: 1 INJECTION, SOLUTION INTRAMUSCULAR; INTRAVENOUS at 08:26

## 2021-02-01 RX ADMIN — SODIUM CHLORIDE, POTASSIUM CHLORIDE, SODIUM LACTATE AND CALCIUM CHLORIDE: 600; 310; 30; 20 INJECTION, SOLUTION INTRAVENOUS at 09:28

## 2021-02-01 RX ADMIN — SODIUM CHLORIDE, POTASSIUM CHLORIDE, SODIUM LACTATE AND CALCIUM CHLORIDE: 600; 310; 30; 20 INJECTION, SOLUTION INTRAVENOUS at 08:06

## 2021-02-01 RX ADMIN — FENTANYL CITRATE 50 MCG: 50 INJECTION, SOLUTION INTRAMUSCULAR; INTRAVENOUS at 08:26

## 2021-02-01 RX ADMIN — FENTANYL CITRATE 25 MCG: 50 INJECTION, SOLUTION INTRAMUSCULAR; INTRAVENOUS at 10:03

## 2021-02-01 RX ADMIN — FENTANYL CITRATE 50 MCG: 50 INJECTION, SOLUTION INTRAMUSCULAR; INTRAVENOUS at 08:34

## 2021-02-01 RX ADMIN — DEXAMETHASONE SODIUM PHOSPHATE 1.3 MG: 4 INJECTION, SOLUTION INTRA-ARTICULAR; INTRALESIONAL; INTRAMUSCULAR; INTRAVENOUS; SOFT TISSUE at 08:32

## 2021-02-01 RX ADMIN — PROPOFOL 200 MG: 10 INJECTION, EMULSION INTRAVENOUS at 08:34

## 2021-02-01 RX ADMIN — ONDANSETRON 4 MG: 2 INJECTION INTRAMUSCULAR; INTRAVENOUS at 09:28

## 2021-02-01 RX ADMIN — ACETAMINOPHEN 1000 MG: 500 TABLET ORAL at 21:50

## 2021-02-01 RX ADMIN — ACETAMINOPHEN 1000 MG: 500 TABLET ORAL at 16:58

## 2021-02-01 RX ADMIN — BUPIVACAINE HYDROCHLORIDE 12 ML: 5 INJECTION, SOLUTION EPIDURAL; INTRACAUDAL; PERINEURAL at 08:32

## 2021-02-01 RX ADMIN — DEXAMETHASONE SODIUM PHOSPHATE 2 MG: 4 INJECTION, SOLUTION INTRA-ARTICULAR; INTRALESIONAL; INTRAMUSCULAR; INTRAVENOUS; SOFT TISSUE at 08:30

## 2021-02-01 RX ADMIN — ACETAMINOPHEN 1000 MG: 500 TABLET ORAL at 11:55

## 2021-02-01 RX ADMIN — CEFAZOLIN 2 G: 330 INJECTION, POWDER, FOR SOLUTION INTRAMUSCULAR; INTRAVENOUS at 08:23

## 2021-02-01 RX ADMIN — IBUPROFEN 800 MG: 200 TABLET, FILM COATED ORAL at 16:58

## 2021-02-01 RX ADMIN — POVIDONE IODINE 15 ML: 100 SOLUTION TOPICAL at 07:50

## 2021-02-01 ASSESSMENT — COGNITIVE AND FUNCTIONAL STATUS - GENERAL
TOILETING: A LITTLE
EATING MEALS: A LITTLE
WALKING IN HOSPITAL ROOM: A LOT
SUGGESTED CMS G CODE MODIFIER MOBILITY: CK
HELP NEEDED FOR BATHING: A LOT
MOVING FROM LYING ON BACK TO SITTING ON SIDE OF FLAT BED: A LITTLE
MOBILITY SCORE: 16
PERSONAL GROOMING: A LITTLE
DRESSING REGULAR LOWER BODY CLOTHING: A LOT
DAILY ACTIVITIY SCORE: 17
MOVING TO AND FROM BED TO CHAIR: A LITTLE
CLIMB 3 TO 5 STEPS WITH RAILING: A LOT
STANDING UP FROM CHAIR USING ARMS: A LOT
SUGGESTED CMS G CODE MODIFIER DAILY ACTIVITY: CK

## 2021-02-01 ASSESSMENT — LIFESTYLE VARIABLES
DOES PATIENT WANT TO STOP DRINKING: NO
HAVE YOU EVER FELT YOU SHOULD CUT DOWN ON YOUR DRINKING: NO
HOW MANY TIMES IN THE PAST YEAR HAVE YOU HAD 5 OR MORE DRINKS IN A DAY: 0
TOTAL SCORE: 0
ALCOHOL_USE: NO
TOTAL SCORE: 0
EVER FELT BAD OR GUILTY ABOUT YOUR DRINKING: NO
EVER HAD A DRINK FIRST THING IN THE MORNING TO STEADY YOUR NERVES TO GET RID OF A HANGOVER: NO
ALCOHOL_USE: NO
AVERAGE NUMBER OF DAYS PER WEEK YOU HAVE A DRINK CONTAINING ALCOHOL: 0
CONSUMPTION TOTAL: NEGATIVE
ON A TYPICAL DAY WHEN YOU DRINK ALCOHOL HOW MANY DRINKS DO YOU HAVE: 0
TOTAL SCORE: 0
HAVE PEOPLE ANNOYED YOU BY CRITICIZING YOUR DRINKING: NO

## 2021-02-01 ASSESSMENT — PAIN DESCRIPTION - PAIN TYPE
TYPE: ACUTE PAIN;SURGICAL PAIN
TYPE: ACUTE PAIN
TYPE: ACUTE PAIN

## 2021-02-01 ASSESSMENT — PATIENT HEALTH QUESTIONNAIRE - PHQ9
SUM OF ALL RESPONSES TO PHQ9 QUESTIONS 1 AND 2: 0
1. LITTLE INTEREST OR PLEASURE IN DOING THINGS: NOT AT ALL
2. FEELING DOWN, DEPRESSED, IRRITABLE, OR HOPELESS: NOT AT ALL

## 2021-02-01 ASSESSMENT — PAIN SCALES - GENERAL: PAIN_LEVEL: 3

## 2021-02-01 NOTE — ANESTHESIA POSTPROCEDURE EVALUATION
Patient: Maria C Lay    Procedure Summary     Date: 02/01/21 Room / Location: [place] [id number] / SURGERY [facility]    Anesthesia Start: 0823 Anesthesia Stop: 0940    Procedure: AMPUTATION, BELOW KNEE, NEGATIVE PRESSURE INCISIONAL DRESSING (Right Leg Lower) Diagnosis: (Right ankle post traumatic arthrits and chronic pain)    Surgeons: Steffen Apple M.D. Responsible Provider: Davonte Jon M.D.    Anesthesia Type: general, peripheral nerve block ASA Status: 2          Final Anesthesia Type: general, peripheral nerve block  Last vitals  BP   Blood Pressure: 108/57    Temp   36.4 °C (97.5 °F)    Pulse   Pulse: (!) 53   Resp   12    SpO2   92 %      Anesthesia Post Evaluation    Patient location during evaluation: PACU  Patient participation: complete - patient participated  Level of consciousness: awake and alert  Pain score: 3    Airway patency: patent  Anesthetic complications: no  Cardiovascular status: hemodynamically stable  Respiratory status: acceptable  Hydration status: euvolemic    PONV: none           Nurse Pain Score: 3 (NPRS)

## 2021-02-01 NOTE — ANESTHESIA PROCEDURE NOTES
Peripheral Block    Date/Time: 2/1/2021 8:26 AM  Performed by: Davonte Jon M.D.  Authorized by: Davonte Jon M.D.     Patient Location:  OR  Start Time:  2/1/2021 8:26 AM  End Time:  2/1/2021 8:33 AM  Reason for Block: at surgeon's request and post-op pain management ONLY    patient identified, IV checked, site marked, risks and benefits discussed, surgical consent, monitors and equipment checked, pre-op evaluation and timeout performed    Patient Position:  Supine  Prep: ChloraPrep    Monitoring:  Heart rate, continuous pulse ox and cardiac monitor  Block Region:  Lower Extremity  Lower Extremity - Block Type:  SCIATIC nerve block, lateral approach    Laterality:  Right  Procedures: ultrasound guided  Image captured, interpreted and electronically stored.  Local Infiltration:  Lidocaine  Strength:  1 %  Dose:  3 ml  Block Type:  Single-shot  Needle Length:  100mm  Needle Gauge:  21 G  Needle Localization:  Ultrasound guidance  Injection Assessment:  Negative aspiration for heme, no paresthesia on injection, incremental injection and local visualized surrounding nerve on ultrasound  Evidence of intravascular injection: No     US Guided Sciatic Nerve Block   US probe placed several cm proximal to popliteal crease on posterior thigh and scanned caudad and cephalad until Sciatic Nerve (SN) identified superficial/lateral to popliteal artery.  Needle inserted lateral to probe in an in plane approach under direct visualization to a perineural position.  After negative aspiration LA injected with ease and visualized surrounding the SN.

## 2021-02-01 NOTE — ANESTHESIA PROCEDURE NOTES
Peripheral Block    Date/Time: 2/1/2021 8:26 AM  Performed by: Davonte Jon M.D.  Authorized by: Davonte Jon M.D.     Patient Location:  OR  Start Time:  2/1/2021 8:26 AM  End Time:  2/1/2021 8:33 AM  Reason for Block: at surgeon's request and post-op pain management ONLY    patient identified, IV checked, site marked, risks and benefits discussed, surgical consent, monitors and equipment checked, pre-op evaluation and timeout performed    Patient Position:  Supine  Prep: ChloraPrep    Monitoring:  Heart rate, continuous pulse ox and cardiac monitor  Block Region:  Lower Extremity  Lower Extremity - Block Type:  Selective FEMORAL nerve block at the Adductor Canal    Laterality:  Right  Procedures: ultrasound guided  Image captured, interpreted and electronically stored.  Local Infiltration:  Lidocaine  Strength:  1 %  Dose:  3 ml  Block Type:  Single-shot  Needle Length:  100mm  Needle Gauge:  21 G  Needle Localization:  Ultrasound guidance  Injection Assessment:  Negative aspiration for heme, no paresthesia on injection, incremental injection and local visualized surrounding nerve on ultrasound  Evidence of intravascular injection: No     US Guided Selective Femoral Nerve Block at Adductor Canal:   US probe placed at mid-thigh level on externally rotated leg and femur identified.  Probe directed medially until Sartorius Muscle (SM), Femoral Artery (FA) and Saphenous Nerve (SN) identified in Adductor Canal (AC).  Needle inserted anterolateral to probe in an in plane approach into a subsartorial perivascular perineural position.  After negative aspiration LA injected with ease and visualized spreading within the AC.

## 2021-02-01 NOTE — ANESTHESIA PROCEDURE NOTES
Airway    Date/Time: 2/1/2021 8:35 AM  Performed by: Davonte Jon M.D.  Authorized by: Davonte Jon M.D.     Location:  OR  Urgency:  Elective  Indications for Airway Management:  Anesthesia      Spontaneous Ventilation: absent    Sedation Level:  Deep  Preoxygenated: Yes    Final Airway Type:  Supraglottic airway  Final Supraglottic Airway:  Standard LMA    SGA Size:  3  Number of Attempts at Approach:  1   Atraumatic insertion, good seal

## 2021-02-01 NOTE — OR NURSING
Patient arrived to PACU, monitor applied. Denies nausea. C/o pain treated with IV and PO medication.  Claudio updated on patient condition, plan of care, and room assignment.

## 2021-02-01 NOTE — OP REPORT
DATE OF OPERATION: 2/1/2021     PREOPERATIVE DIAGNOSIS: right lower extremity osteomyelitis    POSTOPERATIVE DIAGNOSIS: Same    PROCEDURE PERFORMED: right below knee amputation, negative pressure incisional dressing.    SURGEON: Steffen Apple M.D.    ANESTHESIA: General    INDICATIONS: The patient is a 45 y.o.-year-old female who presents with a post traumatic chronic pain.  Given their radiographic and exam findings, the patient is an appropriately indicated candidate for right below knee amputation.  I discussed the risks, benefits, and alternatives of surgery with the patient.  Risks discussed included failure to clear the infection, wound healing complication, need for additional surgery including more proximal amputation, neurovascular injury, blood loss, phantom limb sensation or pain, DVT/PE, and the medical risks of anesthesia (including stroke, MI, and death).  Benefits discussed included improved chance of clearance of the infection and improved function.  The patient is in agreement with the plan to proceed, and their informed consent was signed and documented in the medical record.  I met with the patient pre-operatively and marked the operative extremity with their agreement.  She was taken to the operating room.    SPECIMEN: None    ESTIMATED BLOOD LOSS: Minimal    COMPLICATIONS: None    PROCEDURE: The patient underwent general anesthesia, and was positioned supine with all bony prominences well padded.  A well padded non-sterile tourniquet was applied the the operative extremity, and the operative extremity was prepped and draped in sterile orthopaedic fashion, using ChloraPrep.  The surgical team scrubbed in, and a procedural pause was conducted to verify correct patient, correct extremity, presence of the surgeons initials on the operative extremity, and administration of IV antibiotics, in this case, Ancef.    Following generalized agreement, the tourniquet was inflated, and a standard below  knee amputation flap was drawn.  The incision was then made, and we dissected through fascia sharply.  The anterior and lateral compartment musculature was then divided with cautery, and the anterior vascular bundle was ligated.  A traction neurolysis was performed on the nerve.    We then elevated the periosteum of the tibia proximally, and performed a transverse osteotomy of the tibia using the saw.  A napkin ring osteotomy of the fibula was performed with the saw, making our proximal cut just proximal to the tibial cut.  The amputation was then completed, and the posterior flap contoured using the amputation knife.  The residual limb was passed off.  The anterior aspect of the tibia was beveled using the saw.    We then dissected out the posterior neurovascular bundle, and ligated the vessels.  A traction neurolysis was performed on the nerve, as well as the sural nerve.  The posterior flap was debulked as needed, and the tourniquet deflated.  Hemostasis was obtained with cautery.  The wound was then irrigated with copious amounts of normal saline.  The wound was then closed over a hemovac drain, with #1 Vicryl in the fascia, a few 2-0 Vicryl in the subcutaneous tissue, and 2-0 Nylon in the skin.  Sterile dressings and a knee immobilizer were applied, and the patient was transported to the recovery room in stable condition, suffering no complications.  All counts were correct.    The use of Dr. Mercy Perkins as a surgical assistant was necessary for assistance with amputation and closure.    Post-Operative Plan:    1.  NWB operative extremity, knee immobilizer when at rest    ____________________________________   Steffen Apple M.D.    DD: 2/1/2021  10:11 AM

## 2021-02-01 NOTE — ANESTHESIA TIME REPORT
Anesthesia Start and Stop Event Times     Date Time Event    2/1/2021 0806 Ready for Procedure     0823 Anesthesia Start     0940 Anesthesia Stop        Responsible Staff  02/01/21    Name Role Begin End    Davonte Jon M.D. Anesth 0823 0940        Preop Diagnosis (Free Text):  Pre-op Diagnosis     Right ankle post traumatic arthrits and chronic pain        Preop Diagnosis (Codes):    Post op Diagnosis  Post-traumatic arthritis of right ankle  chronic right ankle pain    Premium Reason  Non-Premium    Comments: Two peripheral nerve blocks: #1 Sciatic, #2 Adductor Canal

## 2021-02-01 NOTE — ANESTHESIA PREPROCEDURE EVALUATION
Anxiety/depression, hx of right ankle fusion. Denies: MI/CHF/smoking/CVA/DM/CKD      Relevant Problems   NEURO   (+) Hx gestational diabetes       Physical Exam    Airway   Mallampati: II  TM distance: >3 FB  Neck ROM: full       Cardiovascular - normal exam  Rhythm: regular  Rate: normal  (-) murmur     Dental - normal exam           Pulmonary - normal exam  Breath sounds clear to auscultation     Abdominal    Neurological - normal exam                 Anesthesia Plan    ASA 2       Plan - general and peripheral nerve block     Peripheral nerve block will be post-op pain control  Airway plan will be LMA        Induction: intravenous    Postoperative Plan: Postoperative administration of opioids is intended.    Pertinent diagnostic labs and testing reviewed    Informed Consent:    Anesthetic plan and risks discussed with patient.    Use of blood products discussed with: patient whom consented to blood products.

## 2021-02-01 NOTE — PROGRESS NOTES
Med rec completed per Pt at bedside.  Pt denies taking any prescription medications.  Allergies reviewed with Pt. No known drug allergies.  No oral antibiotics in last 14 days.  Pt takes ASPIRIN.

## 2021-02-02 VITALS
BODY MASS INDEX: 26.97 KG/M2 | TEMPERATURE: 97.3 F | RESPIRATION RATE: 16 BRPM | HEIGHT: 61 IN | SYSTOLIC BLOOD PRESSURE: 96 MMHG | DIASTOLIC BLOOD PRESSURE: 58 MMHG | WEIGHT: 142.86 LBS | HEART RATE: 62 BPM | OXYGEN SATURATION: 95 %

## 2021-02-02 PROCEDURE — 700102 HCHG RX REV CODE 250 W/ 637 OVERRIDE(OP): Performed by: ORTHOPAEDIC SURGERY

## 2021-02-02 PROCEDURE — A9270 NON-COVERED ITEM OR SERVICE: HCPCS | Performed by: ORTHOPAEDIC SURGERY

## 2021-02-02 RX ADMIN — ASPIRIN 81 MG: 81 TABLET, COATED ORAL at 05:20

## 2021-02-02 RX ADMIN — ACETAMINOPHEN 1000 MG: 500 TABLET ORAL at 05:20

## 2021-02-02 RX ADMIN — IBUPROFEN 800 MG: 200 TABLET, FILM COATED ORAL at 05:20

## 2021-02-02 NOTE — CARE PLAN
Problem: Pain Management  Goal: Pain level will decrease to patient's comfort goal  Outcome: PROGRESSING AS EXPECTED     Problem: Communication  Goal: The ability to communicate needs accurately and effectively will improve  Outcome: PROGRESSING AS EXPECTED     Pt arrived to floor, resting comfortably dressing CDI, pain managed effectively w/ medication. Ambulates stand by with crutches. No requests at this time.

## 2021-02-02 NOTE — DISCHARGE INSTRUCTIONS
Discharge Instructions    Discharged to home by car with relative. Discharged via wheelchair, hospital escort: Yes.  Special equipment needed: Not Applicable    Be sure to schedule a follow-up appointment with your primary care doctor or any specialists as instructed.     Discharge Plan:   Influenza Vaccine Indication: Patient Refuses    I understand that a diet low in cholesterol, fat, and sodium is recommended for good health. Unless I have been given specific instructions below for another diet, I accept this instruction as my diet prescription.   Other diet: reg    Special Instructions: None    · Is patient discharged on Warfarin / Coumadin?   No     Depression / Suicide Risk    As you are discharged from this [facility] facility, it is important to learn how to keep safe from harming yourself.    Recognize the warning signs:  · Abrupt changes in personality, positive or negative- including increase in energy   · Giving away possessions  · Change in eating patterns- significant weight changes-  positive or negative  · Change in sleeping patterns- unable to sleep or sleeping all the time   · Unwillingness or inability to communicate  · Depression  · Unusual sadness, discouragement and loneliness  · Talk of wanting to die  · Neglect of personal appearance   · Rebelliousness- reckless behavior  · Withdrawal from people/activities they love  · Confusion- inability to concentrate     If you or a loved one observes any of these behaviors or has concerns about self-harm, here's what you can do:  · Talk about it- your feelings and reasons for harming yourself  · Remove any means that you might use to hurt yourself (examples: pills, rope, extension cords, firearm)  · Get professional help from the community (Mental Health, Substance Abuse, psychological counseling)  · Do not be alone:Call your Safe Contact- someone whom you trust who will be there for you.  · Call your local CRISIS HOTLINE 285-0230 or  673.881.2364  · Call your local Children's Mobile Crisis Response Team [place] (797) 754-1528 or www.[employer].com  · Call the toll free National Suicide Prevention Hotlines   · National Suicide Prevention Lifeline 048-172-HWLE (1362)  · National Hope Line Network 800-SUICIDE (300-5836)    Leg Amputation, Care After  This sheet gives you information about how to care for yourself after your procedure. Your health care provider may also give you more specific instructions. If you have problems or questions, contact your health care provider.  What can I expect after the procedure?  After the procedure, it is common to have:  · A little blood or fluid coming from your incision.  · Pain from your incision.  · Pain that feels like it is coming from the leg that has been removed (phantom pain). This can last for a year or longer.  · Skin breakdown on your stump (residual limb).  · Feelings of depression, anxiety, and fear.  Follow these instructions at home:  Medicines  · Take over-the-counter and prescription medicines only as told by your health care provider.  · If you were prescribed an antibiotic medicine, take it as told by your health care provider. Do not stop taking the antibiotic even if you start to feel better.  Bathing  · Do not take baths, swim, use a hot tub, or get your residual limb wet until your health care provider approves. You may only be allowed to take sponge baths.  · Ask your health care provider when you may start taking showers. After taking a shower, make sure to rinse and dry your residual limb carefully.  Incision care    · Check your residual limb, especially your incision area, every day. Check for:  ? More redness, swelling, or pain.  ? More fluid or blood.  ? Warmth.  ? Pus or a bad smell.  ? Blisters.  ? Scrapes.  · Follow instructions from your health care provider about how to take care of your incision. Make sure you:  ? Wash your hands with soap and water before you change  your bandage (dressing). If soap and water are not available, use hand .  ? Change your dressing as told by your health care provider.  ? Leave stitches (sutures), skin glue, or adhesive strips in place. These skin closures may need to stay in place for 2 weeks or longer. If adhesive strip edges start to loosen and curl up, you may trim the loose edges. Do not remove adhesive strips completely unless your health care provider tells you to do that.  Activity  · Return to your normal activities as told by your health care provider. Ask your health care provider what activities are safe for you.  · Do physical therapy exercises as told by your health care provider.  · If you have been fitted with an artificial leg (prosthesis) or have been given crutches, use them as told by your health care provider.  Eating and drinking  · Eat a healthy diet that includes whole grains, fruits and vegetables, low-fat dairy products, and lean proteins.  · Drink enough fluid to keep your urine pale yellow.  Driving  · Work with an occupational therapist to learn new strategies for safe driving with an amputation.  · Do not drive or use heavy equipment while taking prescription pain medicine.  General instructions  · To prevent or treat constipation while you are taking prescription pain medicine, your health care provider may recommend that you:  ? Drink enough fluid to keep your urine pale yellow.  ? Take over-the-counter or prescription medicines.  ? Eat foods that are high in fiber, such as fresh fruits and vegetables, whole grains, and beans.  ? Limit foods that are high in fat and processed sugars, such as fried and sweet foods.  · Do not use oils, lotion, cream, or rubbing alcohol on the remaining part of your leg.  · Wear compression stockings as told by your health care provider.  · If you have trouble coping with your amputation, contact your health care provider. Some feelings of depression, anxiety, or fear are  normal after an amputation, but if you struggle with these feelings or if they get overwhelming, your provider may be able to recommend a therapist or support group to help you.  · Do not use any products that contain nicotine or tobacco, such as cigarettes and e-cigarettes. These can delay bone healing. If you need help quitting, ask your health care provider.  · Keep all follow-up visits as told by your health care provider. This is important.  Contact a health care provider if:  · You have a fever.  · You have more tenderness in your residual limb.  · You have a rash or itchy skin.  · You have a cough or chills and you feel achy and weak.  · You have trouble coping with your amputation.  · You have blisters or scrapes on your residual limb.  Get help right away if:  · You have severe pain in your residual limb.  · You have more redness, swelling, or pain around your incision.  · You have more fluid or blood coming from your incision.  · Your incision feels warm to the touch, tender, and painful.  · You have pus or a bad smell coming from your incision.  · You feel light-headed and have shortness of breath.  · You have blood-soaked bandages.  · You cough up blood.  · You have chest pain or pain when taking a deep breath or coughing. If you have these symptoms, do not drive yourself to the hospital. Call emergency services right away.  If you ever feel like you may hurt yourself or others, or have thoughts about taking your own life, get help right away. You can go to your nearest emergency department or call:  · Your local emergency services (911 in the [place]  · A suicide crisis helpline, such as the National Suicide Prevention Lifeline at 1-617.162.5514. This is open 24 hours a day.  Summary  · After a leg amputation, you may have pain that feels like it is coming from the leg that was removed (phantom pain). This can last for a year or longer.  · Follow instructions from your health care provider about how to  take care of your incision.  · Check your residual limb, especially your incision area, every day. More redness, swelling, or pain may be a sign of infection.  · Contact your health care provider if you have trouble coping with your amputation.  This information is not intended to replace advice given to you by your health care provider. Make sure you discuss any questions you have with your health care provider.  Document Released: 03/28/2018 Document Revised: 03/28/2018 Document Reviewed: 03/28/2018  Elsevier Patient Education © 2020 [employer]

## 2021-02-02 NOTE — PROGRESS NOTES
S:     POD # 1 s/p R BKA  NAEs overnight.  Denies n/v/f/c/sob/cp    O:   Splint/dressings c/d/I   Sensation blunted due to PNB   Toes up/downgoing   Brisk refill all toes   Drain removed at bedside this morning    A:    POD# 1 s/p R BKA overall doing well    P:   NWB   Elevation  PT/OT   DVT ppx: ASA 81 mg po bid   D/c plan: dc home today    Mercy Perkins M.D.

## 2022-09-22 ENCOUNTER — OFFICE VISIT (OUTPATIENT)
Dept: URGENT CARE | Facility: CLINIC | Age: 47
End: 2022-09-22
Payer: COMMERCIAL

## 2022-09-22 VITALS
HEIGHT: 61 IN | WEIGHT: 173 LBS | SYSTOLIC BLOOD PRESSURE: 112 MMHG | HEART RATE: 78 BPM | OXYGEN SATURATION: 97 % | BODY MASS INDEX: 32.66 KG/M2 | TEMPERATURE: 97.9 F | RESPIRATION RATE: 16 BRPM | DIASTOLIC BLOOD PRESSURE: 84 MMHG

## 2022-09-22 DIAGNOSIS — G44.209 TENSION HEADACHE: ICD-10-CM

## 2022-09-22 DIAGNOSIS — R42 VERTIGO: ICD-10-CM

## 2022-09-22 DIAGNOSIS — J06.9 VIRAL URI: ICD-10-CM

## 2022-09-22 PROCEDURE — 99214 OFFICE O/P EST MOD 30 MIN: CPT | Performed by: STUDENT IN AN ORGANIZED HEALTH CARE EDUCATION/TRAINING PROGRAM

## 2022-09-22 RX ORDER — MECLIZINE HYDROCHLORIDE 25 MG/1
25 TABLET ORAL 3 TIMES DAILY PRN
Qty: 30 TABLET | Refills: 0 | Status: SHIPPED | OUTPATIENT
Start: 2022-09-22 | End: 2023-06-16

## 2022-09-22 RX ORDER — MECLIZINE HYDROCHLORIDE 25 MG/1
25 TABLET ORAL 3 TIMES DAILY PRN
Qty: 30 TABLET | Refills: 0 | Status: SHIPPED | OUTPATIENT
Start: 2022-09-22 | End: 2022-09-22

## 2022-09-22 ASSESSMENT — ENCOUNTER SYMPTOMS
HEMOPTYSIS: 0
EYE PAIN: 0
EYE REDNESS: 0
NAUSEA: 0
CLAUDICATION: 0
WEAKNESS: 0
BLURRED VISION: 0
SINUS PAIN: 0
DIZZINESS: 1
VOMITING: 0
SPUTUM PRODUCTION: 0
ORTHOPNEA: 0
EYE DISCHARGE: 0
SHORTNESS OF BREATH: 1
FEVER: 0
CHILLS: 0
TINGLING: 0
ABDOMINAL PAIN: 0
COUGH: 0
FOCAL WEAKNESS: 0
PND: 0
HEADACHES: 1
DIARRHEA: 0
SPEECH CHANGE: 0
STRIDOR: 0
WHEEZING: 0
TREMORS: 0
PALPITATIONS: 0
SORE THROAT: 0
SENSORY CHANGE: 0
DOUBLE VISION: 0
PHOTOPHOBIA: 0

## 2022-09-23 NOTE — PROGRESS NOTES
Subjective:     Maria C Lay  is a 47 y.o. female who presents for Shortness of Breath (Headache x 3 weeks/Dizziness, chest pain, ear pain ( R ) x today )    Patient has been dizzy since she woke up this morning. It has been intermittent. She also states that she had a headaches for 3 weeks. She states these headaches are frontal and feels like its throbbing, last all day, tylenol helps, but they never go away. Headaches do not wake her up in the middle of the night, not worse in the morning, do not wake up with nausea/vomiting. Also mentions that she has had intermittent chest pain since she was ill, stabbing pain, lasts for several hours, does not change with breathing. Still hard to catch her breath since she was ill, but the last 2 days has been getting worse.     Dizziness, started this morning, fells like the world is spinning, worse with head movements, feels like equilibrium is off. Does not feel like her hearing is muffled. Never had vertigo before. Denies tinnitus    Thinks she had COVID 3 weeks ago because she was ill, fatigued, headaches, could not eat, SOB, no cough.        HPI Review of Systems   Constitutional:  Negative for chills, fever and malaise/fatigue.   HENT:  Positive for congestion. Negative for ear discharge, ear pain, hearing loss, nosebleeds, sinus pain, sore throat and tinnitus.    Eyes:  Negative for blurred vision, double vision, photophobia, pain, discharge and redness.   Respiratory:  Positive for shortness of breath. Negative for cough, hemoptysis, sputum production, wheezing and stridor.    Cardiovascular:  Positive for chest pain. Negative for palpitations, orthopnea, claudication, leg swelling and PND.   Gastrointestinal:  Negative for abdominal pain, diarrhea, nausea and vomiting.   Skin:  Negative for rash.   Neurological:  Positive for dizziness and headaches. Negative for tingling, tremors, sensory change, speech change, focal weakness and weakness.   All other systems  "reviewed and are negative. No Known Allergies  Past Medical History:   Diagnosis Date    Arthritis     right ankle     Pain     right ankle    Psychiatric problem     anxiety depression    Tibia and fibula open fracture, right         Objective:   /84   Pulse 78   Temp 36.6 °C (97.9 °F) (Temporal)   Resp 16   Ht 1.549 m (5' 1\")   Wt 78.5 kg (173 lb)   SpO2 97%   BMI 32.69 kg/m²   Physical Exam  Vitals and nursing note reviewed.   Constitutional:       General: She is not in acute distress.     Appearance: Normal appearance. She is normal weight. She is not ill-appearing.   HENT:      Head: Normocephalic and atraumatic.      Right Ear: Tympanic membrane, ear canal and external ear normal.      Left Ear: Tympanic membrane, ear canal and external ear normal.      Nose: Nose normal.      Mouth/Throat:      Mouth: Mucous membranes are moist.      Pharynx: Oropharynx is clear. No oropharyngeal exudate or posterior oropharyngeal erythema.   Eyes:      General:         Right eye: No discharge.         Left eye: No discharge.      Extraocular Movements: Extraocular movements intact.      Pupils: Pupils are equal, round, and reactive to light.   Cardiovascular:      Rate and Rhythm: Normal rate and regular rhythm.      Heart sounds: No murmur heard.    No friction rub. No gallop.   Pulmonary:      Effort: Pulmonary effort is normal. No respiratory distress.      Breath sounds: Normal breath sounds. No wheezing, rhonchi or rales.   Chest:      Chest wall: No tenderness.   Abdominal:      General: Abdomen is flat.      Palpations: Abdomen is soft.   Musculoskeletal:      Cervical back: Normal range of motion and neck supple. No rigidity or tenderness.   Lymphadenopathy:      Cervical: No cervical adenopathy.   Neurological:      General: No focal deficit present.      Mental Status: She is alert and oriented to person, place, and time.      Comments: Chema-hallpike exam was positive on the left        "   Assessment/Plan:     Encounter Diagnoses   Name Primary?    Vertigo     Tension headache     Viral URI        Patient presents with acute onset vertigo starting this AM. States she had a viral sounding URI 3 weeks ago which has been resolving. Still intermittent, sharp chest pains, SOB on exertion, and mild fatigue since illness. 2 predominant symptoms today are vertigo and frontal headaches. Denies any red flag symptoms for increased ICP. Headaches do not wake her up in middle of night. No focal neurology. On exam, she had a normal neuro exam with no focal signs. Ears clear. [place] hallpike positive on left. Diff dx for vertigo are vestibular neuritis vs BPPV. Headaches likely tension. For vertigo she was given meclizine PRN for symptoms and epley maneuvers to perform at home. For headaches, told to use excedrin PRN for symptoms. Return precautions given including worsening headache, worsening persistent vertigo, or development of focal neurological signs.     Assessment    1. Vertigo  - meclizine (ANTIVERT) 25 MG Tab; Take 1 Tablet by mouth 3 times a day as needed for Dizziness, Nausea/Vomiting or Vertigo.  Dispense: 30 Tablet; Refill: 0    2. Tension headache    3. Viral URI     Plan for care for today's complaint includes meclizine PRN, epley maneuver, excedrin.  Prescription for meclizine provided.  Natural history, supportive care measures, and indications for immediate follow-up for worsening headache, focal neurology, or worsening persistent vertigo  discussed.    I personally provided the printed AVS to the patient and highlighted important points.  Patient's questions answered.  Advised that patient arrange routine followup care with primary physician. Made appointment with Dr. Chano Zarate in 6 weeks.     See AVS Instructions below for written guidance provided to patient on after-visit management and care in addition to our verbal discussion during the visit.    Please note that this dictation was  created using voice recognition software. I have attempted to correct all errors, but there may be sound-alike, spelling, grammar and possibly content errors that I did not discover before finalizing the note.

## 2022-11-01 SDOH — ECONOMIC STABILITY: FOOD INSECURITY: WITHIN THE PAST 12 MONTHS, THE FOOD YOU BOUGHT JUST DIDN'T LAST AND YOU DIDN'T HAVE MONEY TO GET MORE.: NEVER TRUE

## 2022-11-01 SDOH — ECONOMIC STABILITY: INCOME INSECURITY: IN THE LAST 12 MONTHS, WAS THERE A TIME WHEN YOU WERE NOT ABLE TO PAY THE MORTGAGE OR RENT ON TIME?: NO

## 2022-11-01 SDOH — ECONOMIC STABILITY: TRANSPORTATION INSECURITY
IN THE PAST 12 MONTHS, HAS LACK OF RELIABLE TRANSPORTATION KEPT YOU FROM MEDICAL APPOINTMENTS, MEETINGS, WORK OR FROM GETTING THINGS NEEDED FOR DAILY LIVING?: NO

## 2022-11-01 SDOH — ECONOMIC STABILITY: TRANSPORTATION INSECURITY
IN THE PAST 12 MONTHS, HAS LACK OF TRANSPORTATION KEPT YOU FROM MEETINGS, WORK, OR FROM GETTING THINGS NEEDED FOR DAILY LIVING?: NO

## 2022-11-01 SDOH — ECONOMIC STABILITY: HOUSING INSECURITY: IN THE LAST 12 MONTHS, HOW MANY PLACES HAVE YOU LIVED?: 1

## 2022-11-01 SDOH — HEALTH STABILITY: PHYSICAL HEALTH: ON AVERAGE, HOW MANY DAYS PER WEEK DO YOU ENGAGE IN MODERATE TO STRENUOUS EXERCISE (LIKE A BRISK WALK)?: 5 DAYS

## 2022-11-01 SDOH — ECONOMIC STABILITY: HOUSING INSECURITY
IN THE LAST 12 MONTHS, WAS THERE A TIME WHEN YOU DID NOT HAVE A STEADY PLACE TO SLEEP OR SLEPT IN A SHELTER (INCLUDING NOW)?: NO

## 2022-11-01 SDOH — ECONOMIC STABILITY: INCOME INSECURITY: HOW HARD IS IT FOR YOU TO PAY FOR THE VERY BASICS LIKE FOOD, HOUSING, MEDICAL CARE, AND HEATING?: NOT VERY HARD

## 2022-11-01 SDOH — HEALTH STABILITY: MENTAL HEALTH
STRESS IS WHEN SOMEONE FEELS TENSE, NERVOUS, ANXIOUS, OR CAN'T SLEEP AT NIGHT BECAUSE THEIR MIND IS TROUBLED. HOW STRESSED ARE YOU?: ONLY A LITTLE

## 2022-11-01 SDOH — HEALTH STABILITY: PHYSICAL HEALTH: ON AVERAGE, HOW MANY MINUTES DO YOU ENGAGE IN EXERCISE AT THIS LEVEL?: 30 MIN

## 2022-11-01 SDOH — ECONOMIC STABILITY: FOOD INSECURITY: WITHIN THE PAST 12 MONTHS, YOU WORRIED THAT YOUR FOOD WOULD RUN OUT BEFORE YOU GOT MONEY TO BUY MORE.: NEVER TRUE

## 2022-11-01 SDOH — ECONOMIC STABILITY: TRANSPORTATION INSECURITY
IN THE PAST 12 MONTHS, HAS THE LACK OF TRANSPORTATION KEPT YOU FROM MEDICAL APPOINTMENTS OR FROM GETTING MEDICATIONS?: NO

## 2022-11-01 ASSESSMENT — SOCIAL DETERMINANTS OF HEALTH (SDOH)
IN A TYPICAL WEEK, HOW MANY TIMES DO YOU TALK ON THE PHONE WITH FAMILY, FRIENDS, OR NEIGHBORS?: MORE THAN THREE TIMES A WEEK
HOW OFTEN DO YOU GET TOGETHER WITH FRIENDS OR RELATIVES?: ONCE A WEEK
HOW OFTEN DO YOU GET TOGETHER WITH FRIENDS OR RELATIVES?: ONCE A WEEK
DO YOU BELONG TO ANY CLUBS OR ORGANIZATIONS SUCH AS CHURCH GROUPS UNIONS, FRATERNAL OR ATHLETIC GROUPS, OR SCHOOL GROUPS?: NO
DO YOU BELONG TO ANY CLUBS OR ORGANIZATIONS SUCH AS CHURCH GROUPS UNIONS, FRATERNAL OR ATHLETIC GROUPS, OR SCHOOL GROUPS?: NO
HOW OFTEN DO YOU ATTENT MEETINGS OF THE CLUB OR ORGANIZATION YOU BELONG TO?: NEVER
HOW OFTEN DO YOU HAVE SIX OR MORE DRINKS ON ONE OCCASION: NEVER
HOW MANY DRINKS CONTAINING ALCOHOL DO YOU HAVE ON A TYPICAL DAY WHEN YOU ARE DRINKING: PATIENT DOES NOT DRINK
WITHIN THE PAST 12 MONTHS, YOU WORRIED THAT YOUR FOOD WOULD RUN OUT BEFORE YOU GOT THE MONEY TO BUY MORE: NEVER TRUE
HOW OFTEN DO YOU ATTEND CHURCH OR RELIGIOUS SERVICES?: NEVER
IN A TYPICAL WEEK, HOW MANY TIMES DO YOU TALK ON THE PHONE WITH FAMILY, FRIENDS, OR NEIGHBORS?: MORE THAN THREE TIMES A WEEK
HOW OFTEN DO YOU HAVE A DRINK CONTAINING ALCOHOL: NEVER
HOW HARD IS IT FOR YOU TO PAY FOR THE VERY BASICS LIKE FOOD, HOUSING, MEDICAL CARE, AND HEATING?: NOT VERY HARD
HOW OFTEN DO YOU ATTENT MEETINGS OF THE CLUB OR ORGANIZATION YOU BELONG TO?: NEVER
HOW OFTEN DO YOU ATTEND CHURCH OR RELIGIOUS SERVICES?: NEVER

## 2022-11-01 ASSESSMENT — LIFESTYLE VARIABLES
HOW MANY STANDARD DRINKS CONTAINING ALCOHOL DO YOU HAVE ON A TYPICAL DAY: PATIENT DOES NOT DRINK
SKIP TO QUESTIONS 9-10: 1
HOW OFTEN DO YOU HAVE A DRINK CONTAINING ALCOHOL: NEVER
AUDIT-C TOTAL SCORE: 0
HOW OFTEN DO YOU HAVE SIX OR MORE DRINKS ON ONE OCCASION: NEVER

## 2022-11-04 ENCOUNTER — APPOINTMENT (OUTPATIENT)
Dept: RADIOLOGY | Facility: CLINIC | Age: 47
End: 2022-11-04
Attending: STUDENT IN AN ORGANIZED HEALTH CARE EDUCATION/TRAINING PROGRAM
Payer: COMMERCIAL

## 2022-11-04 ENCOUNTER — HOSPITAL ENCOUNTER (EMERGENCY)
Facility: MEDICAL CENTER | Age: 47
End: 2022-11-04
Attending: EMERGENCY MEDICINE
Payer: COMMERCIAL

## 2022-11-04 ENCOUNTER — OFFICE VISIT (OUTPATIENT)
Dept: MEDICAL GROUP | Facility: CLINIC | Age: 47
End: 2022-11-04
Payer: COMMERCIAL

## 2022-11-04 ENCOUNTER — APPOINTMENT (OUTPATIENT)
Dept: RADIOLOGY | Facility: MEDICAL CENTER | Age: 47
End: 2022-11-04
Payer: COMMERCIAL

## 2022-11-04 VITALS
RESPIRATION RATE: 18 BRPM | SYSTOLIC BLOOD PRESSURE: 118 MMHG | DIASTOLIC BLOOD PRESSURE: 78 MMHG | BODY MASS INDEX: 32.66 KG/M2 | OXYGEN SATURATION: 95 % | WEIGHT: 172.84 LBS | TEMPERATURE: 97.5 F | HEART RATE: 60 BPM

## 2022-11-04 VITALS — BODY MASS INDEX: 32.1 KG/M2 | WEIGHT: 170 LBS | HEIGHT: 61 IN

## 2022-11-04 DIAGNOSIS — R07.89 OTHER CHEST PAIN: ICD-10-CM

## 2022-11-04 DIAGNOSIS — R07.9 ACUTE CHEST PAIN: ICD-10-CM

## 2022-11-04 DIAGNOSIS — R42 VERTIGO: ICD-10-CM

## 2022-11-04 LAB
ALBUMIN SERPL BCP-MCNC: 4.5 G/DL (ref 3.2–4.9)
ALBUMIN/GLOB SERPL: 1.4 G/DL
ALP SERPL-CCNC: 74 U/L (ref 30–99)
ALT SERPL-CCNC: 25 U/L (ref 2–50)
ANION GAP SERPL CALC-SCNC: 11 MMOL/L (ref 7–16)
AST SERPL-CCNC: 24 U/L (ref 12–45)
BASOPHILS # BLD AUTO: 1.7 % (ref 0–1.8)
BASOPHILS # BLD: 0.22 K/UL (ref 0–0.12)
BILIRUB SERPL-MCNC: 0.2 MG/DL (ref 0.1–1.5)
BUN SERPL-MCNC: 8 MG/DL (ref 8–22)
CALCIUM SERPL-MCNC: 9.2 MG/DL (ref 8.5–10.5)
CHLORIDE SERPL-SCNC: 105 MMOL/L (ref 96–112)
CO2 SERPL-SCNC: 25 MMOL/L (ref 20–33)
CREAT SERPL-MCNC: 0.77 MG/DL (ref 0.5–1.4)
EKG IMPRESSION: NORMAL
EOSINOPHIL # BLD AUTO: 0.22 K/UL (ref 0–0.51)
EOSINOPHIL NFR BLD: 1.7 % (ref 0–6.9)
ERYTHROCYTE [DISTWIDTH] IN BLOOD BY AUTOMATED COUNT: 48.6 FL (ref 35.9–50)
GFR SERPLBLD CREATININE-BSD FMLA CKD-EPI: 96 ML/MIN/1.73 M 2
GLOBULIN SER CALC-MCNC: 3.3 G/DL (ref 1.9–3.5)
GLUCOSE SERPL-MCNC: 83 MG/DL (ref 65–99)
HCG SERPL QL: NEGATIVE
HCT VFR BLD AUTO: 45.3 % (ref 37–47)
HGB BLD-MCNC: 14.6 G/DL (ref 12–16)
LYMPHOCYTES # BLD AUTO: 4.75 K/UL (ref 1–4.8)
LYMPHOCYTES NFR BLD: 37.4 % (ref 22–41)
MANUAL DIFF BLD: NORMAL
MCH RBC QN AUTO: 31.1 PG (ref 27–33)
MCHC RBC AUTO-ENTMCNC: 32.2 G/DL (ref 33.6–35)
MCV RBC AUTO: 96.4 FL (ref 81.4–97.8)
MONOCYTES # BLD AUTO: 1.22 K/UL (ref 0–0.85)
MONOCYTES NFR BLD AUTO: 9.6 % (ref 0–13.4)
MORPHOLOGY BLD-IMP: NORMAL
NEUTROPHILS # BLD AUTO: 6.3 K/UL (ref 2–7.15)
NEUTROPHILS NFR BLD: 49.6 % (ref 44–72)
NRBC # BLD AUTO: 0 K/UL
NRBC BLD-RTO: 0 /100 WBC
PLATELET # BLD AUTO: 408 K/UL (ref 164–446)
PLATELET BLD QL SMEAR: NORMAL
PMV BLD AUTO: 11.2 FL (ref 9–12.9)
POTASSIUM SERPL-SCNC: 4.6 MMOL/L (ref 3.6–5.5)
PROT SERPL-MCNC: 7.8 G/DL (ref 6–8.2)
RBC # BLD AUTO: 4.7 M/UL (ref 4.2–5.4)
RBC BLD AUTO: NORMAL
SODIUM SERPL-SCNC: 141 MMOL/L (ref 135–145)
TROPONIN T SERPL-MCNC: <6 NG/L (ref 6–19)
WBC # BLD AUTO: 12.7 K/UL (ref 4.8–10.8)

## 2022-11-04 PROCEDURE — 71046 X-RAY EXAM CHEST 2 VIEWS: CPT | Mod: TC | Performed by: FAMILY MEDICINE

## 2022-11-04 PROCEDURE — 84484 ASSAY OF TROPONIN QUANT: CPT

## 2022-11-04 PROCEDURE — 85007 BL SMEAR W/DIFF WBC COUNT: CPT

## 2022-11-04 PROCEDURE — 93005 ELECTROCARDIOGRAM TRACING: CPT

## 2022-11-04 PROCEDURE — 84703 CHORIONIC GONADOTROPIN ASSAY: CPT

## 2022-11-04 PROCEDURE — 99283 EMERGENCY DEPT VISIT LOW MDM: CPT

## 2022-11-04 PROCEDURE — 93005 ELECTROCARDIOGRAM TRACING: CPT | Performed by: EMERGENCY MEDICINE

## 2022-11-04 PROCEDURE — 99204 OFFICE O/P NEW MOD 45 MIN: CPT | Mod: GC | Performed by: STUDENT IN AN ORGANIZED HEALTH CARE EDUCATION/TRAINING PROGRAM

## 2022-11-04 PROCEDURE — 93000 ELECTROCARDIOGRAM COMPLETE: CPT | Performed by: STUDENT IN AN ORGANIZED HEALTH CARE EDUCATION/TRAINING PROGRAM

## 2022-11-04 PROCEDURE — 80053 COMPREHEN METABOLIC PANEL: CPT

## 2022-11-04 PROCEDURE — 85025 COMPLETE CBC W/AUTO DIFF WBC: CPT

## 2022-11-04 PROCEDURE — 36415 COLL VENOUS BLD VENIPUNCTURE: CPT

## 2022-11-04 RX ORDER — NITROGLYCERIN 0.4 MG/1
0.4 TABLET SUBLINGUAL ONCE
OUTPATIENT
Start: 2022-11-04 | End: 2022-11-07

## 2022-11-04 RX ORDER — ASPIRIN 325 MG
325 TABLET ORAL ONCE
OUTPATIENT
Start: 2022-11-04 | End: 2022-11-07

## 2022-11-04 NOTE — PROGRESS NOTES
[facility]    Chief Complaint   Patient presents with    Establish Care     Vertigo issues, pain in chest, shortness of breath,        HISTORY OF PRESENT ILLNESS: Patient is a 47 y.o. female established patient who presents today to discuss the medical issues below.    Problem   Other Chest Pain   Vertigo       Patient Active Problem List    Diagnosis Date Noted    Other chest pain 2022    Vertigo 2022    Tension headache 2022    Viral URI 2022    Other specified disorder of gallbladder 2016    Biliary dyskinesia EF on HIDA 24% 2016    Hx gestational diabetes 2016    Osteoarthritis of ankle, right 2016    Chronic pain of right ankle 2016    H/O ankle fusion 2016    Ankle fracture 2016       Allergies:Patient has no known allergies.    Current Outpatient Medications   Medication Sig Dispense Refill    meclizine (ANTIVERT) 25 MG Tab Take 1 Tablet by mouth 3 times a day as needed for Dizziness, Nausea/Vomiting or Vertigo. (Patient not taking: Reported on 2022) 30 Tablet 0     Current Facility-Administered Medications   Medication Dose Route Frequency Provider Last Rate Last Admin    nitroglycerin (NITROSTAT) tablet 0.4 mg  0.4 mg Sublingual Once Chano Zarate M.D.        aspirin (ASA) tablet 325 mg  325 mg Oral Once Chano Zarate M.D.             Past Medical History:   Diagnosis Date    Arthritis     right ankle     Pain     right ankle    Psychiatric problem     anxiety depression    Tibia and fibula open fracture, right        Social History     Tobacco Use    Smoking status: Former     Packs/day: 1.00     Years: 25.00     Pack years: 25.00     Types: Cigarettes     Quit date: 2010     Years since quittin.8    Smokeless tobacco: Never   Vaping Use    Vaping Use: Never used   Substance Use Topics    Alcohol use: No     Alcohol/week: 0.0 oz     Comment: occ    Drug use: Not Currently     Comment: marijuana daily,  "01/15/2021       Family Status   Relation Name Status    MGMo  (Not Specified)    OTHER  (Not Specified)     Family History   Problem Relation Age of Onset    Cancer Maternal Grandmother         breast    Depression Other        ROS:  Negative except as stated above.       Exam:    Ht 1.549 m (5' 1\")   Wt 77.1 kg (170 lb)  Body mass index is 32.12 kg/m².  General:  Well nourished, well developed female in NAD.  HENT: Normocephalic, bilateral TMs are intact, nasal and oral mucosa with no lesions,   Neck: Supple without bruit. Thyroid is not enlarged, no nodules palpated.  Pulmonary: Clear to ausculation.  Normal effort. No rales, rhonchi, or wheezing.  Cardiovascular: Regular rate and rhythm without murmur.   Abdomen: Normal bowel sounds, soft and nontender, no palpable liver, spleen, or masses.  Extremities: No LE edema noted. 5/5 strength in all extremities  Neuro: Grossly nonfocal.  Psych: Alert and oriented to person, place, and time. Appropriate mood and conversation.              Assessment/Plan:    1. Vertigo        2. Other chest pain  DX-CHEST-2 VIEWS    EKG - Clinic Performed    nitroglycerin (NITROSTAT) tablet 0.4 mg    aspirin (ASA) tablet 325 mg          Orders Placed This Encounter    DX-CHEST-2 VIEWS    EKG - Clinic Performed    nitroglycerin (NITROSTAT) tablet 0.4 mg    aspirin (ASA) tablet 325 mg         Other chest pain  Chronic.   Continuing to have left sided upper chest wall pain, dull in nature, not pleuritic, constant, does no radiate, not worse with exertion or movement, notices more in the evening when resting. Does not sound cardiac in nature, no association with eating. No SOB, but can't take a deep breath. No cough, wheeze, or fevers. No increased pain on palpation. Diff dx at this time are MSK chest pain, cardiac chest pain, PE, PNA, costochondritis, GERD. EKG was performed in office showing sinus rhythm, no axis deviation, but inverted t-waves in the anterior leads with no EKG for " comparison. Even though this chest pain is chronic in nature and does not appear to be cardiac from the symptomology, it could not be ruled out after this EKG. The patient was directed to the ER so they could complete a troponin and further evaluate this chest pain. CXR completed in office was interpreted as normal, no signs of consolidation.    Plan:   -Give 1 dose of sublingual nitrate and 325mg ASA NOW  -Present to the ER via EMS for further evaluation of chest pain      Vertigo  Chronic. Improved.   Patient was recently evaluated in urgent care for this condition which started shortly after a viral URI. Exam was performed at this time, showing a positive Chema Hallpike on the left so a presumptive diagnosis of BPPV was attributed to her symptoms. She was given meclizine at that time, which had little effect on her symptoms but she states that the Epley maneuver really improved her symptoms. She no longer complains of this condition and states it is mostly resolved.   Plan:   -Can continue meclizine PRN for vertigo   -CTM         Followup: Return in about 1 month (around 12/4/2022).     Chano Zarate MD   [facility]   PGY-3

## 2022-11-04 NOTE — ASSESSMENT & PLAN NOTE
Chronic.   Continuing to have left sided upper chest wall pain, dull in nature, not pleuritic, constant, does no radiate, not worse with exertion or movement, notices more in the evening when resting. Does not sound cardiac in nature, no association with eating. No SOB, but can't take a deep breath. No cough, wheeze, or fevers. No increased pain on palpation. Diff dx at this time are MSK chest pain, cardiac chest pain, PE, PNA, costochondritis, GERD. EKG was performed in office showing sinus rhythm, no axis deviation, but inverted t-waves in the anterior leads with no EKG for comparison. Even though this chest pain is chronic in nature and does not appear to be cardiac from the symptomology, it could not be ruled out after this EKG. The patient was directed to the ER so they could complete a troponin and further evaluate this chest pain. CXR completed in office was interpreted as normal, no signs of consolidation.    Plan:   -Give 1 dose of sublingual nitrate and 325mg ASA NOW  -Present to the ER via EMS for further evaluation of chest pain

## 2022-11-05 NOTE — ED NOTES
Pt discharged home with SO. IV discontinued and gauze placed, pt in possession of belongings. Pt is A/O x4, pt is ambulatory with a steady gait. Pt provided discharge education and information pertaining to medications and follow up appointments. Pt received copy of discharge instructions and verbalized understanding. Discussed signs and symptoms to return to the ER, patient verbalized understanding.     /78   Pulse 60   Temp 36.4 °C (97.5 °F) (Temporal)   Resp 18   Wt 78.4 kg (172 lb 13.5 oz)   SpO2 95%   BMI 32.66 kg/m²

## 2022-11-05 NOTE — ED PROVIDER NOTES
ED Provider Note    CHIEF COMPLAINT  Chief Complaint   Patient presents with    Chest Pain     X1 month, dull, pinpoint, constant    Sent by MD     Sent by MD for abnormal EKG.        EMILIANO Lay is a 47 y.o. female who presents for evaluation of left-sided chest pain for the past month or 2, dull, constantly present, it is at a pinpoint location.  Not short of breath, at times however she feels as though she has a do a concentrated effort to take a deep breath.  No coughing.  No unilateral leg pain or swelling.  No back pain, no fever, no abdominal pain or vomiting.  Went to her primary care physician today for follow-up of vertigo, she mentioned this chest discomfort and they did an EKG revealing some inverted T waves anteriorly so she was sent here for evaluation.  She has no history of hypercholesterolemia, hypertension or diabetes.  She was a former smoker.  No history no family history of early cardiac disease.  This pain is not radiating.  It is not exertional.  It all began with a respiratory-like illness, she never tested herself for COVID but suspects that is what it was.  She has not received vaccinations against COVID.    REVIEW OF SYSTEMS  Negative for fever, rash, abdominal pain, nausea, vomiting, diarrhea, headache. All other systems are negative.     PAST MEDICAL HISTORY  Past Medical History:   Diagnosis Date    Arthritis     right ankle     Pain     right ankle    Psychiatric problem     anxiety depression    Tibia and fibula open fracture, right        FAMILY HISTORY  Family History   Problem Relation Age of Onset    Cancer Maternal Grandmother         breast    Depression Other        SOCIAL HISTORY  Social History     Tobacco Use    Smoking status: Former     Packs/day: 1.00     Years: 25.00     Pack years: 25.00     Types: Cigarettes     Quit date: 2010     Years since quittin.8    Smokeless tobacco: Never   Vaping Use    Vaping Use: Never used   Substance Use Topics     Alcohol use: No     Alcohol/week: 0.0 oz     Comment: occ    Drug use: Not Currently     Comment: marijuana daily, 01/15/2021       SURGICAL HISTORY  Past Surgical History:   Procedure Laterality Date    KNEE AMPUTATION BELOW Right 2/1/2021    Procedure: AMPUTATION, BELOW KNEE, NEGATIVE PRESSURE INCISIONAL DRESSING;  Surgeon: Steffen Apple M.D.;  Location: SURGERY [facility];  Service: Orthopedics    STEPHANIE BY LAPAROSCOPY  11/9/2016    Procedure: STEPHANIE BY LAPAROSCOPY;  Surgeon: Campos Frankel M.D.;  Location: SURGERY [facility];  Service:     ANKLE FUSION Right 5/16/2016    Procedure: ANKLE FUSION deep cultures, bone graft gastroc soleus recession ;  Surgeon: Steffen Apple M.D.;  Location: SURGERY [facility];  Service:     HARDWARE REMOVAL ORTHO  5/16/2016    Procedure: HARDWARE REMOVAL ORTHO, repairs as indicated  ;  Surgeon: Steffen Apple M.D.;  Location: SURGERY [facility];  Service:     APPENDECTOMY LAPAROSCOPIC  7/14/2012    Performed by CAMPOS FRANKEL at SURGERY [facility]    OTHER ORTHOPEDIC SURGERY Right 2007     rt ankle repair,k pins    OTHER ORTHOPEDIC SURGERY Right 2006 x 2 surgeries     rt ankle tib/fib,external fixator    OTHER ORTHOPEDIC SURGERY      total of 5 right ankle surgeries       CURRENT MEDICATIONS  I personally reviewed the medication list in the charting documentation.     ALLERGIES  No Known Allergies    MEDICAL RECORD  I have reviewed patient's medical record and pertinent results are listed above.      PHYSICAL EXAM  VITAL SIGNS: /73   Pulse (!) 58   Temp 36.4 °C (97.5 °F) (Temporal)   Resp 16   Wt 78.4 kg (172 lb 13.5 oz)   SpO2 97%   BMI 32.66 kg/m²    Constitutional: Well appearing patient in no acute distress.  Awake and alert, not toxic nor ill in appearance.  HENT: Normocephalic, no obvious evidence of acute trauma.  Eyes: No scleral icterus. Normal conjunctiva   Neck: Comfortable movement without any obvious restriction in the range of  motion.  Cardiovascular: Upon ascultation I appreciate a regular heart rhythm and a normal rate.   Thorax & Lungs: Normal nonlabored respirations.  Upon application of the stethoscope for auscultation I find there to be no associated chest wall tenderness.  I appreciate no wheezing, rhonchi or rales. There is normal air movement.    Abdomen: The abdomen is not visibly distended. Upon palpation, I find it to be without tenderness.  No mass appreciated.  Skin: The exposed portions of skin reveal no obvious rash or other abnormalities.  Extremities/Musculoskeletal: Right lower extremity amputation with prosthetic  Neurologic: Alert & oriented. No focal deficits observed.   Psychiatric: Normal affect appropriate for the clinical situation.    DIAGNOSTIC STUDIES / PROCEDURES    LABS/EKGs     Results for orders placed or performed during the hospital encounter of 11/04/22   CBC with Differential   Result Value Ref Range    WBC 12.7 (H) 4.8 - 10.8 K/uL    RBC 4.70 4.20 - 5.40 M/uL    Hemoglobin 14.6 12.0 - 16.0 g/dL    Hematocrit 45.3 37.0 - 47.0 %    MCV 96.4 81.4 - 97.8 fL    MCH 31.1 27.0 - 33.0 pg    MCHC 32.2 (L) 33.6 - 35.0 g/dL    RDW 48.6 35.9 - 50.0 fL    Platelet Count 408 164 - 446 K/uL    MPV 11.2 9.0 - 12.9 fL    Neutrophils-Polys 49.60 44.00 - 72.00 %    Lymphocytes 37.40 22.00 - 41.00 %    Monocytes 9.60 0.00 - 13.40 %    Eosinophils 1.70 0.00 - 6.90 %    Basophils 1.70 0.00 - 1.80 %    Nucleated RBC 0.00 /100 WBC    Neutrophils (Absolute) 6.30 2.00 - 7.15 K/uL    Lymphs (Absolute) 4.75 1.00 - 4.80 K/uL    Monos (Absolute) 1.22 (H) 0.00 - 0.85 K/uL    Eos (Absolute) 0.22 0.00 - 0.51 K/uL    Baso (Absolute) 0.22 (H) 0.00 - 0.12 K/uL    NRBC (Absolute) 0.00 K/uL   Complete Metabolic Panel (CMP)   Result Value Ref Range    Sodium 141 135 - 145 mmol/L    Potassium 4.6 3.6 - 5.5 mmol/L    Chloride 105 96 - 112 mmol/L    Co2 25 20 - 33 mmol/L    Anion Gap 11.0 7.0 - 16.0    Glucose 83 65 - 99 mg/dL    Bun 8 8 - 22  mg/dL    Creatinine 0.77 0.50 - 1.40 mg/dL    Calcium 9.2 8.5 - 10.5 mg/dL    AST(SGOT) 24 12 - 45 U/L    ALT(SGPT) 25 2 - 50 U/L    Alkaline Phosphatase 74 30 - 99 U/L    Total Bilirubin 0.2 0.1 - 1.5 mg/dL    Albumin 4.5 3.2 - 4.9 g/dL    Total Protein 7.8 6.0 - 8.2 g/dL    Globulin 3.3 1.9 - 3.5 g/dL    A-G Ratio 1.4 g/dL   Troponins NOW   Result Value Ref Range    Troponin T <6 6 - 19 ng/L   ESTIMATED GFR   Result Value Ref Range    GFR (CKD-EPI) 96 >60 mL/min/1.73 m 2   DIFFERENTIAL MANUAL   Result Value Ref Range    Manual Diff Status PERFORMED    PERIPHERAL SMEAR REVIEW   Result Value Ref Range    Peripheral Smear Review see below    PLATELET ESTIMATE   Result Value Ref Range    Plt Estimation Normal    MORPHOLOGY   Result Value Ref Range    RBC Morphology Normal    EKG (NOW)   Result Value Ref Range    Report       [facility] Emergency Dept.    Test Date:  2022  Pt Name:    ANASTASIIA KULKARNI                 Department: ER  MRN:        [id number]                      Room:  Gender:     Female                       Technician: [id number]  :        [id number]                   Requested By:ER TRIAGE PROTOCOL  Order #:    [id number]                    Reading MD: NELIDA GUTIERRES MD    Measurements  Intervals                                Axis  Rate:       58                           P:          53  AR:         140                          QRS:        57  QRSD:       98                           T:          74  QT:         438  QTc:        431    Interpretive Statements  12 Lead EKG interpreted by me to show: -- Rate 58 -- Rhythm: Normal sinus  rhythm  -- Axis: Normal -- AR and QRS Intervals: Normal -- T waves: inversion V2-V4  --  ST segments: No acute changes -- Ectopy: None. My impression of this EKG:  Anterior inversions of T wavs could suggest ischemia  Electronically Signed On  2022 20:04:12 PDT by NELIDA GUTIERRES MD  Electronically Signed On 2022 20:04:45 PDT by NELIDA FAIRCHILD  MD SUMIT          RADIOLOGY  Personally reviewed the two-view chest x-ray performed earlier today, no acute cardiopulmonary disease      COURSE & MEDICAL DECISION MAKING  I have reviewed any medical record information, laboratory studies and radiographic results as noted above.  Differential diagnoses includes: ACS, dehydration, lites abnormalities, anemia, pneumothorax, pneumonia    Encounter Summary: This is a very pleasant 47 y.o. female who unfortunately required evaluation in the emergency department today with left-sided chest pain for greater than a month, constant present, pinpoint in the left chest, not exertional, minimal risk factors for coronary disease, had an EKG at her PCP revealing inverted T waves anteriorly with no old for comparison.  Again demonstrated inverted T waves on the EKG here, no other evidence of ischemia and of most reassurance that her troponin is undetectable, this is very valuable in excluding acute cardiac ischemia given the constant duration of symptoms for the past month or greater.  Chest x-ray there was unremarkable.  At this point, no clear urgent or emergent etiology exist explain her symptoms.  She will be discharged home in stable condition to follow-up with the primary care provider.  Heart score: Low    DISPOSITION: Discharged home in stable condition      FINAL IMPRESSION  1. Acute chest pain           This dictation was created using voice recognition software. The accuracy of the dictation is limited to the abilities of the software. I expect there may be some errors of grammar and possibly content. The nursing notes were reviewed and certain aspects of this information were incorporated into this note.    Electronically signed by: Delgado Chapin M.D., 11/4/2022 8:20 PM

## 2022-11-05 NOTE — ED TRIAGE NOTES
Chief Complaint   Patient presents with    Chest Pain     X1 month, dull, pinpoint, constant    Sent by MD     Sent by MD for abnormal EKG.    Protocol initiated.  /73   Pulse (!) 58   Temp 36.4 °C (97.5 °F) (Temporal)   Resp 16   Wt 78.4 kg (172 lb 13.5 oz)   SpO2 97%   Pt informed of wait times. Educated on triage process.  Asked to return to triage RN for any new or worsening of symptoms. Thanked for patience.

## 2022-11-05 NOTE — ASSESSMENT & PLAN NOTE
Chronic. Improved.   Patient was recently evaluated in urgent care for this condition which started shortly after a viral URI. Exam was performed at this time, showing a positive Chema Hallpike on the left so a presumptive diagnosis of BPPV was attributed to her symptoms. She was given meclizine at that time, which had little effect on her symptoms but she states that the Epley maneuver really improved her symptoms. She no longer complains of this condition and states it is mostly resolved.   Plan:   -Can continue meclizine PRN for vertigo   -CTM

## 2023-03-16 ENCOUNTER — OFFICE VISIT (OUTPATIENT)
Dept: MEDICAL GROUP | Facility: CLINIC | Age: 48
End: 2023-03-16
Payer: COMMERCIAL

## 2023-03-16 DIAGNOSIS — Z89.511 HISTORY OF BELOW KNEE AMPUTATION, RIGHT (HCC): ICD-10-CM

## 2023-03-16 PROCEDURE — 99213 OFFICE O/P EST LOW 20 MIN: CPT | Mod: GE | Performed by: STUDENT IN AN ORGANIZED HEALTH CARE EDUCATION/TRAINING PROGRAM

## 2023-03-16 ASSESSMENT — FIBROSIS 4 INDEX: FIB4 SCORE: 0.54

## 2023-03-16 NOTE — LETTER
March 16, 2023    To Whom It May Concern:         This is confirmation that Maria C Lay attended her scheduled appointment with Chano Zarate M.D. on 3/16/23.  She presented for a prescription for a replacement of a socket for her right below the knee amputation prosthesis.  The reason for this is she had a decrease in the volume of her residual limb.  Her activity level is K4.  She is very active and has an active job.  She also enjoys a hike.  She is out of her home greater than 5 days/week.  She has no regular medications.  No other comorbidities.  Please provide he with a socket for her prosthesis.         If you have any questions please do not hesitate to call me at the phone number listed below.    Sincerely,          Chano Zarate M.D.  [phone]

## 2023-03-17 NOTE — PROGRESS NOTES
[facility]    Chief Complaint   Patient presents with    Paperwork       HISTORY OF PRESENT ILLNESS: Patient is a 47 y.o. female established patient who presents today to discuss the medical issues below.    Problem   History of Below Knee Amputation, Right (Hcc)       Patient Active Problem List    Diagnosis Date Noted    History of below knee amputation, right (HCC) 2023    Other chest pain 2022    Vertigo 2022    Tension headache 2022    Viral URI 2022    Other specified disorder of gallbladder 2016    Biliary dyskinesia EF on HIDA 24% 2016    Hx gestational diabetes 2016    Osteoarthritis of ankle, right 2016    Chronic pain of right ankle 2016    H/O ankle fusion 2016    Ankle fracture 2016       Allergies:Patient has no known allergies.    Current Outpatient Medications   Medication Sig Dispense Refill    meclizine (ANTIVERT) 25 MG Tab Take 1 Tablet by mouth 3 times a day as needed for Dizziness, Nausea/Vomiting or Vertigo. (Patient not taking: Reported on 2022) 30 Tablet 0     No current facility-administered medications for this visit.         Past Medical History:   Diagnosis Date    Arthritis     right ankle     Pain     right ankle    Psychiatric problem     anxiety depression    Tibia and fibula open fracture, right        Social History     Tobacco Use    Smoking status: Former     Packs/day: 1.00     Years: 25.00     Pack years: 25.00     Types: Cigarettes     Quit date: 2010     Years since quittin.2    Smokeless tobacco: Never   Vaping Use    Vaping Use: Never used   Substance Use Topics    Alcohol use: No     Alcohol/week: 0.0 oz     Comment: occ    Drug use: Not Currently     Comment: marijuana daily, 01/15/2021       Family Status   Relation Name Status    MGMo  (Not Specified)    OTHER  (Not Specified)     Family History   Problem Relation Age of Onset    Cancer Maternal Grandmother          breast    Depression Other        ROS:  Negative except as stated above.       Exam:    Vitals:    03/16/23 1438   BP: (P) 122/70   Pulse: (P) 74   Temp: (P) 36.1 °C (97 °F)   SpO2: (P) 94%   Body mass index is 32.39 kg/m² (pended).  General:  Well nourished, well developed female in NAD.  HENT: Normocephalic, bilateral TMs are intact, nasal and oral mucosa with no lesions,   Neck: Supple without bruit. Thyroid is not enlarged, no nodules palpated.  Pulmonary: Clear to ausculation.  Normal effort. No rales, rhonchi, or wheezing.  Cardiovascular: Regular rate and rhythm without murmur.   Abdomen: Normal bowel sounds, soft and nontender, no palpable liver, spleen, or masses.  Extremities: No LE edema noted. 5/5 strength in all extremities. Right BKA with prosthesis   Neuro: Grossly nonfocal.  Psych: Alert and oriented to person, place, and time. Appropriate mood and conversation.              Assessment/Plan:    1. History of below knee amputation, right (HCC)  DME Other          Orders Placed This Encounter    DME Other       History of below knee amputation, right (HCC)  Patient had right BKA. Came to clinic today requesting prescription to adjust prosthesis with  clinic. Patient swelling decreased after surgery, thus requires a new prosthesis. Patient was provided with the script today, DME paperwork, and a doctor's note approving change. Patient left and will discuss this with  clinic.     Patient also advised to make appointment for well woman exam so we can go through all preventative medicine.       Followup: No follow-ups on file.     Chano Zarate MD   [facility]   PGY-3

## 2023-03-17 NOTE — ASSESSMENT & PLAN NOTE
Patient had right BKA. Came to clinic today requesting prescription to adjust prosthesis with  clinic. Patient swelling decreased after surgery, thus requires a new prosthesis. Patient was provided with the script today, DME paperwork, and a doctor's note approving change. Patient left and will discuss this with  clinic.     Patient also advised to make appointment for well woman exam so we can go through all preventative medicine.

## 2023-05-17 ENCOUNTER — OFFICE VISIT (OUTPATIENT)
Dept: MEDICAL GROUP | Facility: CLINIC | Age: 48
End: 2023-05-17
Payer: COMMERCIAL

## 2023-05-17 VITALS — HEIGHT: 61 IN | BODY MASS INDEX: 31.45 KG/M2 | WEIGHT: 166.6 LBS

## 2023-05-17 DIAGNOSIS — Z13.79 GENETIC SCREENING: ICD-10-CM

## 2023-05-17 DIAGNOSIS — Z12.31 ENCOUNTER FOR SCREENING MAMMOGRAM FOR MALIGNANT NEOPLASM OF BREAST: ICD-10-CM

## 2023-05-17 DIAGNOSIS — Z80.3 FAMILY HISTORY OF BREAST CANCER: ICD-10-CM

## 2023-05-17 DIAGNOSIS — Z12.11 SCREENING FOR COLON CANCER: ICD-10-CM

## 2023-05-17 DIAGNOSIS — Z00.00 PREVENTATIVE HEALTH CARE: ICD-10-CM

## 2023-05-17 DIAGNOSIS — N92.1 MENORRHAGIA WITH IRREGULAR CYCLE: ICD-10-CM

## 2023-05-17 PROCEDURE — 99396 PREV VISIT EST AGE 40-64: CPT | Mod: GC | Performed by: STUDENT IN AN ORGANIZED HEALTH CARE EDUCATION/TRAINING PROGRAM

## 2023-05-17 ASSESSMENT — PATIENT HEALTH QUESTIONNAIRE - PHQ9: CLINICAL INTERPRETATION OF PHQ2 SCORE: 0

## 2023-05-17 ASSESSMENT — FIBROSIS 4 INDEX: FIB4 SCORE: 0.54

## 2023-05-17 NOTE — PROGRESS NOTES
Subjective:     CC:   Chief Complaint   Patient presents with    Annual Exam       HPI:   Maria C Lay is a 47 y.o. female who presents for annual exam    Patient has GYN provider: No   Last Pap Smear: Doesn't remember last pap, was here in [place], paps always normal, around 5 years ago    H/O Abnormal Pap: No  Last Mammogram: Aunt and Grandmother had breast cancer, had mammogram 2 years ago,  Last Bone Density Test: None   Last Colorectal Cancer Screening: None that   Last Tdap: 2-3 years ago   Received HPV series: No    Exercise: moderate regular exercise, aerobic < 3 days a week  Diet: Good, well balanced, no sodas       No LMP recorded. - 1 month ago   Hx STDs: No  Birth control: None currently, interested in Mirena   Menses every month with 5 days with heavy bleeding.  Reports moderate cramping and does take OTC analgesics for cramps.  No significant bloating/fluid retention, pelvic pain, or dyspareunia. No abnormal vaginal discharge.  No breast tenderness, mass, nipple discharge, changes in size or contour, or abnormal cyclic discomfort.    OB History   No obstetric history on file.      She  has no history on file for sexual activity.    She  has a past medical history of Arthritis, Pain, Psychiatric problem, and Tibia and fibula open fracture, right.  She  has a past surgical history that includes appendectomy laparoscopic (7/14/2012); amanda by laparoscopy (11/9/2016); other orthopedic surgery (Right, 2006 x 2 surgeries); other orthopedic surgery (Right, 2007 ); ankle fusion (Right, 5/16/2016); hardware removal ortho (5/16/2016); other orthopedic surgery; and knee amputation below (Right, 2/1/2021).    Family History   Problem Relation Age of Onset    Cancer Maternal Grandmother         breast    Depression Other      Social History     Tobacco Use    Smoking status: Former     Packs/day: 1.00     Years: 25.00     Pack years: 25.00     Types: Cigarettes     Quit date: 1/1/2010     Years since quitting:  13.3    Smokeless tobacco: Never   Vaping Use    Vaping Use: Never used   Substance Use Topics    Alcohol use: No     Alcohol/week: 0.0 oz     Comment: occ    Drug use: Not Currently     Comment: marijuana daily, 01/15/2021       Patient Active Problem List    Diagnosis Date Noted    Encounter for screening mammogram for malignant neoplasm of breast 05/17/2023    Genetic screening 05/17/2023    Screening for colon cancer 05/17/2023    Menorrhagia with irregular cycle 05/17/2023    Family history of breast cancer 05/17/2023    BMI 31.0-31.9,adult 05/17/2023    History of below knee amputation, right (HCC) 03/16/2023    Other chest pain 11/04/2022    Vertigo 09/22/2022    Tension headache 09/22/2022    Viral URI 09/22/2022    Other specified disorder of gallbladder 11/09/2016    Biliary dyskinesia EF on HIDA 24% 9-2016 09/21/2016    Hx gestational diabetes 09/09/2016    Osteoarthritis of ankle, right 05/16/2016    Chronic pain of right ankle 03/23/2016    H/O ankle fusion 03/23/2016    Ankle fracture 03/23/2016     Current Outpatient Medications   Medication Sig Dispense Refill    meclizine (ANTIVERT) 25 MG Tab Take 1 Tablet by mouth 3 times a day as needed for Dizziness, Nausea/Vomiting or Vertigo. 30 Tablet 0     No current facility-administered medications for this visit.     No Known Allergies    Review of Systems   Constitutional: Negative for fever, chills and malaise/fatigue.   HENT: Negative for congestion.    Eyes: Negative for pain.   Respiratory: Negative for cough and shortness of breath.    Cardiovascular: Negative for chest pain and leg swelling.   Gastrointestinal: Negative for nausea, vomiting, abdominal pain and diarrhea.   Genitourinary: Negative for dysuria and hematuria.   Skin: Negative for rash.   Neurological: Negative for dizziness, focal weakness and headaches.   Endo/Heme/Allergies: Does not bruise/bleed easily.   Psychiatric/Behavioral: Negative for depression.  The patient is not  "nervous/anxious.      Objective:   BP (P) 118/66 (BP Location: Right arm, Patient Position: Sitting, BP Cuff Size: Adult)   Pulse (P) 60   Temp (P) 36.7 °C (98 °F) (Temporal)   Ht 1.549 m (5' 1\")   Wt 75.6 kg (166 lb 9.6 oz)   SpO2 (P) 96%   BMI 31.48 kg/m²     Wt Readings from Last 4 Encounters:   05/17/23 75.6 kg (166 lb 9.6 oz)   03/16/23 (P) 77.7 kg (171 lb 6.4 oz)   11/04/22 78.4 kg (172 lb 13.5 oz)   11/04/22 77.1 kg (170 lb)       Physical Exam:  Constitutional: Well-developed and well-nourished. Not diaphoretic. No distress.   Skin: Skin is warm and dry. No rash noted.  Head: Atraumatic without lesions.  Eyes: Conjunctivae and extraocular motions are normal. Pupils are equal, round, and reactive to light. No scleral icterus.   Ears:  External ears unremarkable. Tympanic membranes clear and intact.  Nose: Nares patent. Septum midline. Turbinates without erythema nor edema. No discharge.   Mouth/Throat: Tongue normal. Oropharynx is clear and moist. Posterior pharynx without erythema or exudates.  Neck: Supple, trachea midline. Normal range of motion. No thyromegaly present. No lymphadenopathy--cervical or supraclavicular.  Cardiovascular: Regular rate and rhythm, S1 and S2 without murmur, rubs, or gallops.    Respiratory: Effort normal. Clear to auscultation throughout. No adventitious sounds.   Abdomen: Soft, non tender, and without distention. Active bowel sounds in all four quadrants. No rebound, guarding, masses or HSM.  Extremities: No cyanosis, clubbing, erythema, nor edema. Distal pulses intact and symmetric. Right BKA noted with overlying prosthesis   Musculoskeletal: All major joints AROM full in all directions without pain.  Neurological: Alert and oriented x 3. Grossly non-focal. Strength and sensation grossly intact. DTRs 2+/3 and symmetric.   Psychiatric:  Behavior, mood, and affect are appropriate.    Assessment and Plan:     1. Encounter for screening mammogram for malignant neoplasm of " breast  - MA-SCREENING MAMMO BILAT W/TOMOSYNTHESIS W/CAD; Future    2. Genetic screening  - Referral to Genetic Research Studies    3. Screening for colon cancer  - Referral to GI for Colonoscopy    4. Menorrhagia with irregular cycle  - CBC WITH DIFFERENTIAL; Future    5. Preventative health care  - Comp Metabolic Panel; Future  - Lipid Profile; Future    6. Family history of breast cancer  - MA-SCREENING MAMMO BILAT W/TOMOSYNTHESIS W/CAD; Future    7. BMI 31.0-31.9,adult  - HEMOGLOBIN A1C; Future    Health maintenance: See orders above for  Labs per orders  Immunizations per orders  Patient counseled about skin care, diet, supplements, and exercise.  Discussed  mammography screening, STD prevention, HIV risk factors and prevention, feminine hygiene, use and side effects of OCP's, family planning choices, menopause, adequate intake of calcium and vitamin D, diet and exercise, colorectal cancer screening    Patient was referred to the [employer] [place] [facility] given her family history of breast cancer to look for BRCA1/BRCA2 and also Robles study given echogenic liver on previous liver ultrasound.  Patient was also referred for mammograms per family history and new USPSTF guidelines.  Patient was noted to have gestational diabetes in the past, so an A1c was ordered to screen her for diabetes.  Patient notes that her menstrual cycles have always been normal since menarche, but have started become irregular indicating patient could be on the initial steps of menopause.  These periods are very disruptive to her life.  Discussed family-planning and if patient would be interested in a Mirena IUD.  Patient filled out Mirena insurance form today.  Appointment was made for IUD insertion and Pap smear.  Patient also was educated on ibuprofen challenge in the meantime to hopefully reduce bleeding.  Education was provided on COVID vaccines, which patient declines.  Also provided on flu/Shingrix vaccines.    Follow-up: Return in  about 1 month (around 6/17/2023).

## 2023-06-02 ENCOUNTER — RESEARCH ENCOUNTER (OUTPATIENT)
Dept: MEDICAL GROUP | Facility: PHYSICIAN GROUP | Age: 48
End: 2023-06-02
Attending: STUDENT IN AN ORGANIZED HEALTH CARE EDUCATION/TRAINING PROGRAM
Payer: COMMERCIAL

## 2023-06-02 DIAGNOSIS — Z00.6 RESEARCH STUDY PATIENT: ICD-10-CM

## 2023-06-06 ENCOUNTER — HOSPITAL ENCOUNTER (OUTPATIENT)
Dept: RADIOLOGY | Facility: MEDICAL CENTER | Age: 48
End: 2023-06-06
Attending: STUDENT IN AN ORGANIZED HEALTH CARE EDUCATION/TRAINING PROGRAM
Payer: COMMERCIAL

## 2023-06-06 DIAGNOSIS — Z80.3 FAMILY HISTORY OF BREAST CANCER: ICD-10-CM

## 2023-06-06 DIAGNOSIS — Z12.31 ENCOUNTER FOR SCREENING MAMMOGRAM FOR MALIGNANT NEOPLASM OF BREAST: ICD-10-CM

## 2023-06-06 PROCEDURE — 77063 BREAST TOMOSYNTHESIS BI: CPT

## 2023-06-16 ENCOUNTER — OFFICE VISIT (OUTPATIENT)
Dept: MEDICAL GROUP | Facility: CLINIC | Age: 48
End: 2023-06-16
Payer: COMMERCIAL

## 2023-06-16 ENCOUNTER — HOSPITAL ENCOUNTER (EMERGENCY)
Facility: MEDICAL CENTER | Age: 48
End: 2023-06-16
Attending: EMERGENCY MEDICINE
Payer: COMMERCIAL

## 2023-06-16 ENCOUNTER — HOSPITAL ENCOUNTER (OUTPATIENT)
Facility: MEDICAL CENTER | Age: 48
End: 2023-06-16
Attending: STUDENT IN AN ORGANIZED HEALTH CARE EDUCATION/TRAINING PROGRAM
Payer: COMMERCIAL

## 2023-06-16 ENCOUNTER — APPOINTMENT (OUTPATIENT)
Dept: RADIOLOGY | Facility: MEDICAL CENTER | Age: 48
End: 2023-06-16
Attending: EMERGENCY MEDICINE
Payer: COMMERCIAL

## 2023-06-16 VITALS — SYSTOLIC BLOOD PRESSURE: 78 MMHG | DIASTOLIC BLOOD PRESSURE: 46 MMHG | HEART RATE: 45 BPM

## 2023-06-16 VITALS
HEIGHT: 61 IN | OXYGEN SATURATION: 95 % | WEIGHT: 165 LBS | TEMPERATURE: 98.6 F | DIASTOLIC BLOOD PRESSURE: 58 MMHG | HEART RATE: 64 BPM | BODY MASS INDEX: 31.15 KG/M2 | RESPIRATION RATE: 13 BRPM | SYSTOLIC BLOOD PRESSURE: 112 MMHG

## 2023-06-16 DIAGNOSIS — R10.2 PELVIC CRAMPING: ICD-10-CM

## 2023-06-16 DIAGNOSIS — Z12.4 ENCOUNTER FOR PAPANICOLAOU SMEAR OF CERVIX: ICD-10-CM

## 2023-06-16 DIAGNOSIS — R55 VASOVAGAL SYNCOPE: ICD-10-CM

## 2023-06-16 DIAGNOSIS — D72.829 LEUKOCYTOSIS, UNSPECIFIED TYPE: ICD-10-CM

## 2023-06-16 DIAGNOSIS — Z30.430 ENCOUNTER FOR INSERTION OF MIRENA IUD: ICD-10-CM

## 2023-06-16 LAB
ALBUMIN SERPL BCP-MCNC: 3.7 G/DL (ref 3.2–4.9)
ALBUMIN/GLOB SERPL: 1.8 G/DL
ALP SERPL-CCNC: 58 U/L (ref 30–99)
ALT SERPL-CCNC: 17 U/L (ref 2–50)
AMBIGUOUS DTTM AMBI4: NORMAL
ANION GAP SERPL CALC-SCNC: 13 MMOL/L (ref 7–16)
APPEARANCE UR: ABNORMAL
AST SERPL-CCNC: 21 U/L (ref 12–45)
BACTERIA #/AREA URNS HPF: NEGATIVE /HPF
BASOPHILS # BLD AUTO: 0.3 % (ref 0–1.8)
BASOPHILS # BLD: 0.06 K/UL (ref 0–0.12)
BILIRUB SERPL-MCNC: <0.2 MG/DL (ref 0.1–1.5)
BILIRUB UR QL STRIP.AUTO: NEGATIVE
BUN SERPL-MCNC: 8 MG/DL (ref 8–22)
CALCIUM ALBUM COR SERPL-MCNC: 7.9 MG/DL (ref 8.5–10.5)
CALCIUM SERPL-MCNC: 7.7 MG/DL (ref 8.5–10.5)
CHLORIDE SERPL-SCNC: 111 MMOL/L (ref 96–112)
CO2 SERPL-SCNC: 18 MMOL/L (ref 20–33)
COLOR UR: YELLOW
CREAT SERPL-MCNC: 0.73 MG/DL (ref 0.5–1.4)
EKG IMPRESSION: NORMAL
EOSINOPHIL # BLD AUTO: 0.07 K/UL (ref 0–0.51)
EOSINOPHIL NFR BLD: 0.4 % (ref 0–6.9)
EPI CELLS #/AREA URNS HPF: ABNORMAL /HPF
ERYTHROCYTE [DISTWIDTH] IN BLOOD BY AUTOMATED COUNT: 49.7 FL (ref 35.9–50)
GFR SERPLBLD CREATININE-BSD FMLA CKD-EPI: 102 ML/MIN/1.73 M 2
GLOBULIN SER CALC-MCNC: 2.1 G/DL (ref 1.9–3.5)
GLUCOSE SERPL-MCNC: 95 MG/DL (ref 65–99)
GLUCOSE UR STRIP.AUTO-MCNC: NEGATIVE MG/DL
HCT VFR BLD AUTO: 36.8 % (ref 37–47)
HGB BLD-MCNC: 11.7 G/DL (ref 12–16)
IMM GRANULOCYTES # BLD AUTO: 0.12 K/UL (ref 0–0.11)
IMM GRANULOCYTES NFR BLD AUTO: 0.6 % (ref 0–0.9)
KETONES UR STRIP.AUTO-MCNC: NEGATIVE MG/DL
LACTATE SERPL-SCNC: 1.1 MMOL/L (ref 0.5–2)
LEUKOCYTE ESTERASE UR QL STRIP.AUTO: ABNORMAL
LYMPHOCYTES # BLD AUTO: 2.37 K/UL (ref 1–4.8)
LYMPHOCYTES NFR BLD: 12.3 % (ref 22–41)
MCH RBC QN AUTO: 30.6 PG (ref 27–33)
MCHC RBC AUTO-ENTMCNC: 31.8 G/DL (ref 32.2–35.5)
MCV RBC AUTO: 96.3 FL (ref 81.4–97.8)
MICRO URNS: ABNORMAL
MONOCYTES # BLD AUTO: 1.45 K/UL (ref 0–0.85)
MONOCYTES NFR BLD AUTO: 7.5 % (ref 0–13.4)
NEUTROPHILS # BLD AUTO: 15.16 K/UL (ref 1.82–7.42)
NEUTROPHILS NFR BLD: 78.9 % (ref 44–72)
NITRITE UR QL STRIP.AUTO: NEGATIVE
NRBC # BLD AUTO: 0 K/UL
NRBC BLD-RTO: 0 /100 WBC (ref 0–0.2)
PH UR STRIP.AUTO: 5.5 [PH] (ref 5–8)
PLATELET # BLD AUTO: 272 K/UL (ref 164–446)
PMV BLD AUTO: 11.8 FL (ref 9–12.9)
POTASSIUM SERPL-SCNC: 3.6 MMOL/L (ref 3.6–5.5)
PROT SERPL-MCNC: 5.8 G/DL (ref 6–8.2)
PROT UR QL STRIP: NEGATIVE MG/DL
RBC # BLD AUTO: 3.82 M/UL (ref 4.2–5.4)
RBC # URNS HPF: ABNORMAL /HPF
RBC UR QL AUTO: ABNORMAL
SODIUM SERPL-SCNC: 142 MMOL/L (ref 135–145)
SP GR UR STRIP.AUTO: 1.01
UROBILINOGEN UR STRIP.AUTO-MCNC: 0.2 MG/DL
WBC # BLD AUTO: 19.2 K/UL (ref 4.8–10.8)
WBC #/AREA URNS HPF: ABNORMAL /HPF

## 2023-06-16 PROCEDURE — 83605 ASSAY OF LACTIC ACID: CPT

## 2023-06-16 PROCEDURE — 85025 COMPLETE CBC W/AUTO DIFF WBC: CPT

## 2023-06-16 PROCEDURE — 71045 X-RAY EXAM CHEST 1 VIEW: CPT

## 2023-06-16 PROCEDURE — 3074F SYST BP LT 130 MM HG: CPT | Mod: GC | Performed by: STUDENT IN AN ORGANIZED HEALTH CARE EDUCATION/TRAINING PROGRAM

## 2023-06-16 PROCEDURE — 96374 THER/PROPH/DIAG INJ IV PUSH: CPT

## 2023-06-16 PROCEDURE — 58300 INSERT INTRAUTERINE DEVICE: CPT | Mod: GC | Performed by: STUDENT IN AN ORGANIZED HEALTH CARE EDUCATION/TRAINING PROGRAM

## 2023-06-16 PROCEDURE — 87040 BLOOD CULTURE FOR BACTERIA: CPT

## 2023-06-16 PROCEDURE — 93005 ELECTROCARDIOGRAM TRACING: CPT

## 2023-06-16 PROCEDURE — 81001 URINALYSIS AUTO W/SCOPE: CPT

## 2023-06-16 PROCEDURE — 99285 EMERGENCY DEPT VISIT HI MDM: CPT

## 2023-06-16 PROCEDURE — 99213 OFFICE O/P EST LOW 20 MIN: CPT | Mod: 25,GE | Performed by: STUDENT IN AN ORGANIZED HEALTH CARE EDUCATION/TRAINING PROGRAM

## 2023-06-16 PROCEDURE — 3078F DIAST BP <80 MM HG: CPT | Mod: GC | Performed by: STUDENT IN AN ORGANIZED HEALTH CARE EDUCATION/TRAINING PROGRAM

## 2023-06-16 PROCEDURE — 93005 ELECTROCARDIOGRAM TRACING: CPT | Performed by: EMERGENCY MEDICINE

## 2023-06-16 PROCEDURE — 36415 COLL VENOUS BLD VENIPUNCTURE: CPT

## 2023-06-16 PROCEDURE — 700111 HCHG RX REV CODE 636 W/ 250 OVERRIDE (IP): Performed by: EMERGENCY MEDICINE

## 2023-06-16 PROCEDURE — 80053 COMPREHEN METABOLIC PANEL: CPT

## 2023-06-16 PROCEDURE — 700105 HCHG RX REV CODE 258: Performed by: EMERGENCY MEDICINE

## 2023-06-16 RX ORDER — KETOROLAC TROMETHAMINE 30 MG/ML
30 INJECTION, SOLUTION INTRAMUSCULAR; INTRAVENOUS ONCE
Status: DISCONTINUED | OUTPATIENT
Start: 2023-06-16 | End: 2023-06-16

## 2023-06-16 RX ORDER — SODIUM CHLORIDE, SODIUM LACTATE, POTASSIUM CHLORIDE, CALCIUM CHLORIDE 600; 310; 30; 20 MG/100ML; MG/100ML; MG/100ML; MG/100ML
1000 INJECTION, SOLUTION INTRAVENOUS ONCE
Status: COMPLETED | OUTPATIENT
Start: 2023-06-16 | End: 2023-06-16

## 2023-06-16 RX ORDER — KETOROLAC TROMETHAMINE 30 MG/ML
30 INJECTION, SOLUTION INTRAMUSCULAR; INTRAVENOUS ONCE
Status: COMPLETED | OUTPATIENT
Start: 2023-06-16 | End: 2023-06-16

## 2023-06-16 RX ADMIN — SODIUM CHLORIDE, POTASSIUM CHLORIDE, SODIUM LACTATE AND CALCIUM CHLORIDE 1000 ML: 600; 310; 30; 20 INJECTION, SOLUTION INTRAVENOUS at 16:22

## 2023-06-16 RX ADMIN — KETOROLAC TROMETHAMINE 30 MG: 30 INJECTION, SOLUTION INTRAMUSCULAR; INTRAVENOUS at 16:39

## 2023-06-16 ASSESSMENT — FIBROSIS 4 INDEX
FIB4 SCORE: 0.54
FIB4 SCORE: 0.54

## 2023-06-16 NOTE — ASSESSMENT & PLAN NOTE
Patient presented for the insertion of a Mirena IUD.  A Pap was completed at the same time.  See procedure note for full details.  After the procedure the patient was having significant cramping, so ordered a shot of Toradol to assist with the pain.  I presented with the MA to give this injection and see how the patient was doing and at this time the patient was sitting in a chair with her head against the window.  She appeared pale and diaphoretic.  She states that she felt like she just had a syncopal episode and was coming to.  We decided to get some vitals on the patient to see her current status and she had a blood pressure of 70/40 and her heart rate was in the 40s.  No significant bleeding was observed.  She has a pad in place after insertion of the IUD. On presentation to clinic, her BP was 118/70 and her heart rate was 70.  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  She is also had a history of menorrhagia which is the reason that the IUD was going to be inserted.  Given that patient was symptomatically pale/diaphoretic and had a blood pressure so low with a MAP of 57 the decision was made to call [facility] for transfer to the ER.  The patient was placed on oxygen.  Handover was provided to paramedics to transfer for to the patient to [facility].
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
## 2023-06-16 NOTE — PROGRESS NOTES
[facility]    Chief Complaint   Patient presents with    Gynecologic Exam    IUD Placement       HISTORY OF PRESENT ILLNESS: Patient is a 47 y.o. female established patient who presents today to discuss the medical issues below.    Problem   Encounter for Insertion of Mirena IUD   Encounter for Papanicolaou Smear of Cervix       Patient Active Problem List    Diagnosis Date Noted    Encounter for insertion of mirena IUD 2023    Encounter for Papanicolaou smear of cervix 2023    Encounter for screening mammogram for malignant neoplasm of breast 2023    Genetic screening 2023    Screening for colon cancer 2023    Menorrhagia with irregular cycle 2023    Family history of breast cancer 2023    BMI 31.0-31.9,adult 2023    History of below knee amputation, right (HCC) 2023    Other chest pain 2022    Vertigo 2022    Tension headache 2022    Viral URI 2022    Other specified disorder of gallbladder 2016    Biliary dyskinesia EF on HIDA 24% 2016    Hx gestational diabetes 2016    Osteoarthritis of ankle, right 2016    Chronic pain of right ankle 2016    H/O ankle fusion 2016    Ankle fracture 2016       Allergies:Patient has no known allergies.    No current outpatient medications on file.     No current facility-administered medications for this visit.         Past Medical History:   Diagnosis Date    Arthritis     right ankle     Pain     right ankle    Psychiatric problem     anxiety depression    Tibia and fibula open fracture, right        Social History     Tobacco Use    Smoking status: Former     Packs/day: 1.00     Years: 25.00     Pack years: 25.00     Types: Cigarettes     Quit date: 2010     Years since quittin.4    Smokeless tobacco: Never   Vaping Use    Vaping Use: Never used   Substance Use Topics    Alcohol use: No     Alcohol/week: 0.0 oz     Comment: occ    Drug  "use: Not Currently     Comment: marijuana daily, 01/15/2021       Family Status   Relation Name Status    MGMo  (Not Specified)    OTHER  (Not Specified)     Family History   Problem Relation Age of Onset    Cancer Maternal Grandmother         breast    Depression Other        ROS:  Negative except as stated above.       Exam:    BP (!) 78/46 (BP Location: Left arm, Patient Position: Sitting, BP Cuff Size: Adult)   Pulse (!) 45   Temp (P) 36.4 °C (97.6 °F) (Temporal)   Ht (P) 1.549 m (5' 1\")   Wt (P) 76 kg (167 lb 8 oz)   SpO2 (P) 93%   BMI (P) 31.65 kg/m²  Body mass index is 31.65 kg/m² (pended).  General:  Well nourished, well developed female in NAD.  HENT: Normocephalic, bilateral TMs are intact, nasal and oral mucosa with no lesions,   Neck: Supple without bruit. Thyroid is not enlarged, no nodules palpated.  Pulmonary: Clear to ausculation.  Normal effort. No rales, rhonchi, or wheezing.  Cardiovascular: Regular rate and rhythm without murmur.   Abdomen: Normal bowel sounds, soft and nontender, no palpable liver, spleen, or masses.  Extremities: No LE edema noted. 5/5 strength in all extremities  Neuro: Grossly nonfocal.  Psych: Alert and oriented to person, place, and time. Appropriate mood and conversation.    Pap: Normal external anatomy.  Normal labia.  No rashes or lesions.  Normal appearing cervix with mild bloody discharge.  Normal vaginal vault.  A broom was used to collect Pap.  No complication observed.          Assessment/Plan:    1. Encounter for insertion of mirena IUD  Consent for all Surgical, Special Diagnostic or Therapeutic Procedures      2. Encounter for Papanicolaou smear of cervix  PAP LB, CT-NG, HPV 16&18      3. Pelvic cramping  DISCONTINUED: ketorolac (TORADOL) injection 30 mg          Orders Placed This Encounter    PAP LB, CT-NG, HPV 16&18    DISCONTD: ketorolac (TORADOL) injection 30 mg    Consent for all Surgical, Special Diagnostic or Therapeutic Procedures       Encounter " for insertion of mirena IUD  Patient presented for the insertion of a Mirena IUD.  A Pap was completed at the same time.  See procedure note for full details.  After the procedure the patient was having significant cramping, so ordered a shot of Toradol to assist with the pain.  I presented with the MA to give this injection and see how the patient was doing and at this time the patient was sitting in a chair with her head against the window.  She appeared pale and diaphoretic.  She states that she felt like she just had a syncopal episode and was coming to.  We decided to get some vitals on the patient to see her current status and she had a blood pressure of 70/40 and her heart rate was in the 40s.  No significant bleeding was observed.  She has a pad in place after insertion of the IUD. On presentation to clinic, her BP was 118/70 and her heart rate was 70.  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  She is also had a history of menorrhagia which is the reason that the IUD was going to be inserted.  Given that patient was symptomatically pale/diaphoretic and had a blood pressure so low with a MAP of 57 the decision was made to call [facility] for transfer to the ER.  The patient was placed on oxygen.  Handover was provided to paramedics to transfer for to the patient to [facility] ER.    Encounter for Papanicolaou smear of cervix  Pap was completed at today's visit successfully.      Followup: Return in about 1 month (around 7/16/2023).     Chano Zarate MD   [facility]   PGY-3

## 2023-06-16 NOTE — ED TRIAGE NOTES
"Chief Complaint   Patient presents with    Syncope     PT was at [facility] to have IUD placed, placement was unsuccessful and PT was experiencing a high level of discomfort + abdominal cramping. PT reports she had a syncopal episode while sitting in office, EMS was called. PT continues to express some lower abdominal discomfort + vaginal bleeding. PT denies CP or SOB.        BIB EMS to yellow 61, pt on monitor, provided call bell and in gown.    Medications given en route: 4mg zofran, 250mL NS    /59   Pulse (!) 51   Resp (!) 9   Ht 1.549 m (5' 1\")   Wt 74.8 kg (165 lb)   LMP 05/26/2023   SpO2 98%   BMI 31.18 kg/m²     "

## 2023-06-16 NOTE — PROCEDURES
IUD Intrauterine Device Insertion Procedure Note  PRE-OP DIAGNOSIS: desired long-term, reversible contraception   POST-OP DIAGNOSIS: Same   PROCEDURE: IUD placement  Performing Physician: Dr. Chano Zarate  Supervising Physician: Dr. Ronaldo Pitts    Checklist:  Multiple Partners   [_] Yes     [x] No  Dysmenorrhea   [x] Yes     [_] No  Copper Allergy   [_] Yes     [x] No  PID/STD    [_] Yes     [x] No  Ectopic Pregnancy   [_] Yes     [x] No  Breastfeeding    [_] Yes     [x] No    IUD type: [x] Mirena ®  [_] Paraguard ®  [_] Deirdre ®  [_] Kyleena ®    PROCEDURE:      Timeout procedure was performed to ensure right patient and right site.   A bimanual exam was performed to determine the position of the uterus.  The speculum was placed. The vagina and cervix was sterilized in the  usual manner and sterile technique was maintained throughout the course  of the procedure. A single toothed tenaculum was applied to the anterior  lip of the cervix and gentle traction applied to straighten and  stabilize it. The depth of the uterus was sounded to be of appropriate depth (7cm). With gentle traction on the tenaculum, the  IUD was inserted to the appropriate depth and deposited by withdrawing  on the insertion tube holding the pam steady.  When I went to cut the strings, the IUD was expelled from the uterus.  I believe that the uterus contracted down and expelled the uterus as I was not pulling on the strings.  The IUD was removed and cotton swabs were applied to remove excess blood from the cervical vault.  An explanation was provided that the procedure was unfortunately unsuccessful and she could present again if she would like another one placed.  The patient was provided with a pad and a white sheet so that she could redress herself.    After the procedure, the patient experienced severe cramping so we attempted to give her Toradol.  On return to the room, the patient was found in a seated position with her head against  the window.  She had a GCS of 14, was diaphoretic, warm to the touch, and pale.  See encounter note for full details.  The patient was transferred to [facility] ER for further work-up and evaluation.     Followup: Standard post-procedure care is explained and return precautions are given.

## 2023-06-17 LAB
FORWARD REASON: SPWHY: NORMAL
FORWARDED TO LAB: SPWHR: NORMAL
SPECIMEN SENT: SPWT1: NORMAL

## 2023-06-21 LAB
BACTERIA BLD CULT: NORMAL
BACTERIA BLD CULT: NORMAL
SIGNIFICANT IND 70042: NORMAL
SIGNIFICANT IND 70042: NORMAL
SITE SITE: NORMAL
SITE SITE: NORMAL
SOURCE SOURCE: NORMAL
SOURCE SOURCE: NORMAL

## 2023-08-15 LAB
APOB+LDLR+PCSK9 GENE MUT ANL BLD/T: NOT DETECTED
BRCA1+BRCA2 DEL+DUP + FULL MUT ANL BLD/T: NOT DETECTED
MLH1+MSH2+MSH6+PMS2 GN DEL+DUP+FUL M: NOT DETECTED

## 2024-12-17 NOTE — ED NOTES
ED tech at bedside for EKG.   Billing Type: Third-Party Bill Bill For Surgical Tray: no Expected Date Of Service: 12/17/2024

## (undated) DEVICE — ELECTRODE 850 FOAM ADHESIVE - HYDROGEL RADIOTRNSPRNT (50/PK)

## (undated) DEVICE — SET LEADWIRE 5 LEAD BEDSIDE DISPOSABLE ECG (1SET OF 5/EA)

## (undated) DEVICE — GLOVE SZ 6.5 BIOGEL PI MICRO - PF LF (50PR/BX)

## (undated) DEVICE — SENSOR SPO2 NEO LNCS ADHESIVE (20/BX) SEE USER NOTES

## (undated) DEVICE — KIT EVACUATER 3 SPRING PVC LF 1/8 DRAIN SIZE (10EA/CA)"

## (undated) DEVICE — ELECTRODE DUAL RETURN W/ CORD - (50/PK)

## (undated) DEVICE — CANISTER SUCTION 3000ML MECHANICAL FILTER AUTO SHUTOFF MEDI-VAC NONSTERILE LF DISP  (40EA/CA)

## (undated) DEVICE — SET EXTENSION WITH 2 PORTS (48EA/CA) ***PART #2C8610 IS A SUBSTITUTE*****

## (undated) DEVICE — GLOVE BIOGEL PI INDICATOR SZ 6.5 SURGICAL PF LF - (50/BX 4BX/CA)

## (undated) DEVICE — SUCTION INSTRUMENT YANKAUER BULBOUS TIP W/O VENT (50EA/CA)

## (undated) DEVICE — PROTECTOR ULNA NERVE - (36PR/CA)

## (undated) DEVICE — SUTURE 2-0 SILK SH 30 IN C/R (12PK/BX)

## (undated) DEVICE — Device

## (undated) DEVICE — BLADE SURGICAL #10 - (50/BX)

## (undated) DEVICE — SUTURE 3-0 ETHILON PS-1 (36PK/BX)

## (undated) DEVICE — NEPTUNE 4 PORT MANIFOLD - (20/PK)

## (undated) DEVICE — MASK, LARYNGEAL AIRWAY #4

## (undated) DEVICE — GOWN WARMING STANDARD FLEX - (30/CA)

## (undated) DEVICE — DRAPE LARGE 3 QUARTER - (20/CA)

## (undated) DEVICE — PADDING CAST 6 IN STERILE - 6 X 4 YDS (24/CA)

## (undated) DEVICE — DISPOSABLE WOUND VAC PICO 10 X 20 CM - WOUND CARE (3/CA)

## (undated) DEVICE — MASK ANESTHESIA ADULT  - (100/CA)

## (undated) DEVICE — GOWN SURGEONS X-LARGE - DISP. (30/CA)

## (undated) DEVICE — TUBING CLEARLINK DUO-VENT - C-FLO (48EA/CA)

## (undated) DEVICE — SUTURE 3-0 VICRYL PLUS - 12 X 18 INCH (12/BX)

## (undated) DEVICE — WATER IRRIGATION STERILE 1000ML (12EA/CA)

## (undated) DEVICE — SODIUM CHL IRRIGATION 0.9% 1000ML (12EA/CA)

## (undated) DEVICE — PAD LAP STERILE 18 X 18 - (5/PK 40PK/CA)

## (undated) DEVICE — TOURNIQUET CUFF 34 X 4 ONE PORT DISP - STERILE (10/BX)

## (undated) DEVICE — GLOVE BIOGEL PI INDICATOR SZ 8.0 SURGICAL PF LF -(50/BX 4BX/CA)

## (undated) DEVICE — SUTURE GENERAL

## (undated) DEVICE — SLEEVE, VASO, THIGH, MED

## (undated) DEVICE — DRESSING ABDOMINAL PAD STERILE 8 X 10" (360EA/CA)"

## (undated) DEVICE — LACTATED RINGERS INJ 1000 ML - (14EA/CA 60CA/PF)

## (undated) DEVICE — STOCKINET BIAS 6 IN STERILE - (20/CA)

## (undated) DEVICE — PACK LOWER EXTREMITY - (2/CA)

## (undated) DEVICE — SPLINT PLASTER 5 IN X 30 IN - (50EA/BX 6BX/CA)

## (undated) DEVICE — BLADE SAGITTAL SAW DUAL CUT 25.0 X 90.0 X 1.27MM (1/EA)

## (undated) DEVICE — GLOVE BIOGEL SZ 6 PF LATEX - (50EA/BX 4BX/CA)

## (undated) DEVICE — GLOVE SZ 7 BIOGEL PI MICRO - PF LF (50PR/BX 4BX/CA)

## (undated) DEVICE — SUTURE 2-0 VICRYL PLUS CT-1 - 8 X 18 INCH(12/BX)

## (undated) DEVICE — GLOVE BIOGEL ECLIPSE PF LATEX SIZE 8.0  (50PR/BX)

## (undated) DEVICE — IMMOBILIZER KNEE 20 INCH - (1/EA)

## (undated) DEVICE — HEAD HOLDER JUNIOR/ADULT

## (undated) DEVICE — GLOVE BIOGEL PI INDICATOR SZ 7.0 SURGICAL PF LF - (50/BX 4BX/CA)

## (undated) DEVICE — KIT ANESTHESIA W/CIRCUIT & 3/LT BAG W/FILTER (20EA/CA)